# Patient Record
Sex: FEMALE | Race: WHITE | NOT HISPANIC OR LATINO | Employment: OTHER | URBAN - METROPOLITAN AREA
[De-identification: names, ages, dates, MRNs, and addresses within clinical notes are randomized per-mention and may not be internally consistent; named-entity substitution may affect disease eponyms.]

---

## 2023-02-28 LAB
ALANINE AMINOTRANSFERASE (SGPT) (U/L) IN SER/PLAS: 12 U/L (ref 7–45)
ALBUMIN (G/DL) IN SER/PLAS: 4.1 G/DL (ref 3.4–5)
ALKALINE PHOSPHATASE (U/L) IN SER/PLAS: 56 U/L (ref 33–136)
ANION GAP IN SER/PLAS: 10 MMOL/L (ref 10–20)
ASPARTATE AMINOTRANSFERASE (SGOT) (U/L) IN SER/PLAS: 17 U/L (ref 9–39)
BILIRUBIN TOTAL (MG/DL) IN SER/PLAS: 0.5 MG/DL (ref 0–1.2)
CALCIUM (MG/DL) IN SER/PLAS: 9.6 MG/DL (ref 8.6–10.6)
CARBON DIOXIDE, TOTAL (MMOL/L) IN SER/PLAS: 29 MMOL/L (ref 21–32)
CARCINOEMBRYONIC AG (NG/ML) IN SER/PLAS: 1.2 UG/L
CHLORIDE (MMOL/L) IN SER/PLAS: 105 MMOL/L (ref 98–107)
CREATININE (MG/DL) IN SER/PLAS: 0.83 MG/DL (ref 0.5–1.05)
GFR FEMALE: 69 ML/MIN/1.73M2
GLUCOSE (MG/DL) IN SER/PLAS: 163 MG/DL (ref 74–99)
POTASSIUM (MMOL/L) IN SER/PLAS: 4.1 MMOL/L (ref 3.5–5.3)
PROTEIN TOTAL: 6.1 G/DL (ref 6.4–8.2)
SODIUM (MMOL/L) IN SER/PLAS: 140 MMOL/L (ref 136–145)
UREA NITROGEN (MG/DL) IN SER/PLAS: 19 MG/DL (ref 6–23)

## 2023-03-20 DIAGNOSIS — Z13.5 DIABETIC RETINOPATHY SCREENING: Primary | ICD-10-CM

## 2023-03-20 DIAGNOSIS — E11.69 TYPE 2 DIABETES MELLITUS WITH OTHER SPECIFIED COMPLICATION, WITHOUT LONG-TERM CURRENT USE OF INSULIN (MULTI): ICD-10-CM

## 2023-03-20 RX ORDER — BLOOD-GLUCOSE METER
1 EACH MISCELLANEOUS 2 TIMES DAILY
COMMUNITY
Start: 2019-10-21 | End: 2023-03-20 | Stop reason: SDUPTHER

## 2023-03-20 RX ORDER — BLOOD-GLUCOSE METER
1 EACH MISCELLANEOUS 2 TIMES DAILY
Qty: 100 STRIP | Refills: 3 | Status: SHIPPED | OUTPATIENT
Start: 2023-03-20 | End: 2023-04-19

## 2023-04-02 PROCEDURE — G0179 MD RECERTIFICATION HHA PT: HCPCS | Performed by: INTERNAL MEDICINE

## 2023-04-08 PROBLEM — R59.0 LYMPHADENOPATHY, INGUINAL: Status: ACTIVE | Noted: 2023-04-08

## 2023-04-08 PROBLEM — R09.82 POSTNASAL DRIP: Status: ACTIVE | Noted: 2023-04-08

## 2023-04-08 PROBLEM — I10 PAROXYSMAL HYPERTENSION: Status: ACTIVE | Noted: 2023-04-08

## 2023-04-08 PROBLEM — E03.9 HYPOTHYROIDISM: Status: ACTIVE | Noted: 2023-04-08

## 2023-04-08 PROBLEM — N39.0 UTI (URINARY TRACT INFECTION): Status: ACTIVE | Noted: 2023-04-08

## 2023-04-08 PROBLEM — R05.9 COUGH: Status: ACTIVE | Noted: 2023-04-08

## 2023-04-08 PROBLEM — R14.2 BELCHING: Status: ACTIVE | Noted: 2023-04-08

## 2023-04-08 PROBLEM — R39.9 LOWER URINARY TRACT SYMPTOMS (LUTS): Status: ACTIVE | Noted: 2023-04-08

## 2023-04-08 PROBLEM — E66.9 CLASS 1 OBESITY WITH BODY MASS INDEX (BMI) OF 31.0 TO 31.9 IN ADULT: Status: ACTIVE | Noted: 2023-04-08

## 2023-04-08 PROBLEM — E11.9 DIABETES MELLITUS (MULTI): Status: ACTIVE | Noted: 2023-04-08

## 2023-04-08 PROBLEM — R63.4 ABNORMAL WEIGHT LOSS: Status: ACTIVE | Noted: 2023-04-08

## 2023-04-08 PROBLEM — R35.1 NOCTURIA: Status: ACTIVE | Noted: 2023-04-08

## 2023-04-08 PROBLEM — R10.2 FEMALE PELVIC PAIN: Status: ACTIVE | Noted: 2023-04-08

## 2023-04-08 PROBLEM — I48.91 ATRIAL FIBRILLATION (MULTI): Status: ACTIVE | Noted: 2023-04-08

## 2023-04-08 PROBLEM — R49.0 HOARSE: Status: ACTIVE | Noted: 2023-04-08

## 2023-04-08 PROBLEM — L85.3 DRY SKIN DERMATITIS: Status: ACTIVE | Noted: 2023-04-08

## 2023-04-08 PROBLEM — E53.8 VITAMIN B12 DEFICIENCY: Status: ACTIVE | Noted: 2023-04-08

## 2023-04-08 PROBLEM — C82.90 FOLLICULAR LYMPHOMA (MULTI): Status: ACTIVE | Noted: 2023-04-08

## 2023-04-08 PROBLEM — M79.606 LEG PAIN: Status: ACTIVE | Noted: 2023-04-08

## 2023-04-08 PROBLEM — R60.0 EDEMA OF BOTH LEGS: Status: ACTIVE | Noted: 2023-04-08

## 2023-04-08 PROBLEM — M25.50 ARTHRALGIA: Status: ACTIVE | Noted: 2023-04-08

## 2023-04-08 PROBLEM — E66.811 CLASS 1 OBESITY WITH BODY MASS INDEX (BMI) OF 31.0 TO 31.9 IN ADULT: Status: ACTIVE | Noted: 2023-04-08

## 2023-04-08 PROBLEM — E55.9 VITAMIN D DEFICIENCY: Status: ACTIVE | Noted: 2023-04-08

## 2023-04-08 PROBLEM — R39.9 URINARY SYMPTOM OR SIGN: Status: ACTIVE | Noted: 2023-04-08

## 2023-04-08 PROBLEM — K58.9 IRRITABLE BOWEL SYNDROME: Status: ACTIVE | Noted: 2023-04-08

## 2023-04-08 PROBLEM — D64.9 ANEMIA: Status: ACTIVE | Noted: 2023-04-08

## 2023-04-08 PROBLEM — N28.89 PELVICALIECTASIS: Status: ACTIVE | Noted: 2023-04-08

## 2023-04-08 PROBLEM — E78.2 HYPERLIPIDEMIA, MIXED: Status: ACTIVE | Noted: 2023-04-08

## 2023-04-08 PROBLEM — M25.512 LEFT SHOULDER PAIN: Status: ACTIVE | Noted: 2023-04-08

## 2023-04-08 PROBLEM — I48.0 PAROXYSMAL ATRIAL FIBRILLATION (MULTI): Status: ACTIVE | Noted: 2023-04-08

## 2023-04-08 PROBLEM — K90.0 NONTROPICAL SPRUE (HHS-HCC): Status: ACTIVE | Noted: 2023-04-08

## 2023-04-08 PROBLEM — K21.9 GASTROESOPHAGEAL REFLUX DISEASE: Status: ACTIVE | Noted: 2023-04-08

## 2023-04-08 PROBLEM — K29.70 GASTRITIS: Status: ACTIVE | Noted: 2023-04-08

## 2023-04-08 PROBLEM — K29.70 ERYTHEMATOUS GASTROPATHY: Status: ACTIVE | Noted: 2023-04-08

## 2023-04-08 PROBLEM — N95.2 ATROPHIC VAGINITIS: Status: ACTIVE | Noted: 2023-04-08

## 2023-04-08 PROBLEM — R11.0 DAILY NAUSEA: Status: ACTIVE | Noted: 2023-04-08

## 2023-04-08 PROBLEM — R10.9 ABDOMINAL PAIN: Status: ACTIVE | Noted: 2023-04-08

## 2023-04-08 PROBLEM — R14.0 BLOATING: Status: ACTIVE | Noted: 2023-04-08

## 2023-04-08 PROBLEM — N39.41 URGE INCONTINENCE: Status: ACTIVE | Noted: 2023-04-08

## 2023-04-08 PROBLEM — L30.9 DERMATITIS: Status: ACTIVE | Noted: 2023-04-08

## 2023-04-08 PROBLEM — K59.00 CONSTIPATION: Status: ACTIVE | Noted: 2023-04-08

## 2023-04-08 PROBLEM — G45.9 TIA (TRANSIENT ISCHEMIC ATTACK): Status: ACTIVE | Noted: 2023-04-08

## 2023-04-08 PROBLEM — R39.15 URINARY URGENCY: Status: ACTIVE | Noted: 2023-04-08

## 2023-04-08 PROBLEM — M25.552 HIP PAIN, LEFT: Status: ACTIVE | Noted: 2023-04-08

## 2023-04-08 PROBLEM — J45.909 ASTHMA (HHS-HCC): Status: ACTIVE | Noted: 2023-04-08

## 2023-04-08 PROBLEM — R59.0 LYMPHADENOPATHY, CERVICAL: Status: ACTIVE | Noted: 2023-04-08

## 2023-04-08 PROBLEM — J39.2 NASOPHARYNGEAL MASS: Status: ACTIVE | Noted: 2023-04-08

## 2023-04-08 PROBLEM — N81.89 PELVIC FLOOR WEAKNESS: Status: ACTIVE | Noted: 2023-04-08

## 2023-04-08 PROBLEM — R91.1 PULMONARY NODULE: Status: ACTIVE | Noted: 2023-04-08

## 2023-04-08 PROBLEM — R00.2 PALPITATIONS: Status: ACTIVE | Noted: 2023-04-08

## 2023-04-08 PROBLEM — M85.80 OSTEOPENIA: Status: ACTIVE | Noted: 2023-04-08

## 2023-04-08 PROBLEM — U07.1 DISEASE DUE TO SEVERE ACUTE RESPIRATORY SYNDROME CORONAVIRUS 2 (SARS-COV-2): Status: ACTIVE | Noted: 2023-04-08

## 2023-04-08 PROBLEM — F41.8 DEPRESSION WITH ANXIETY: Status: ACTIVE | Noted: 2023-04-08

## 2023-04-08 PROBLEM — K44.9 HIATAL HERNIA: Status: ACTIVE | Noted: 2023-04-08

## 2023-04-08 PROBLEM — M54.9 NOTALGIA: Status: ACTIVE | Noted: 2023-04-08

## 2023-04-08 PROBLEM — R19.4 CHANGE IN BOWEL HABITS: Status: ACTIVE | Noted: 2023-04-08

## 2023-04-08 PROBLEM — N32.81 OVERACTIVE BLADDER: Status: ACTIVE | Noted: 2023-04-08

## 2023-04-08 PROBLEM — E04.2 NONTOXIC MULTINODULAR GOITER: Status: ACTIVE | Noted: 2023-04-08

## 2023-04-08 PROBLEM — R73.03 PREDIABETES: Status: ACTIVE | Noted: 2023-04-08

## 2023-04-08 PROBLEM — I86.8 VARICOSE VEINS OF OTHER SPECIFIED SITES: Status: ACTIVE | Noted: 2023-04-08

## 2023-04-08 PROBLEM — R79.9 ABNORMAL BLOOD CHEMISTRY: Status: ACTIVE | Noted: 2023-04-08

## 2023-04-08 PROBLEM — I25.10 ARTERIOSCLEROSIS OF CORONARY ARTERY: Status: ACTIVE | Noted: 2023-04-08

## 2023-04-08 PROBLEM — M25.562 LEFT KNEE PAIN: Status: ACTIVE | Noted: 2023-04-08

## 2023-04-08 PROBLEM — M54.50 LOW BACK PAIN: Status: ACTIVE | Noted: 2023-04-08

## 2023-04-08 PROBLEM — Z20.822 EXPOSURE TO COVID-19 VIRUS: Status: ACTIVE | Noted: 2023-04-08

## 2023-04-08 RX ORDER — FOLIC ACID 1 MG/1
1 TABLET ORAL DAILY
COMMUNITY
End: 2023-04-17 | Stop reason: ALTCHOICE

## 2023-04-08 RX ORDER — AMLODIPINE BESYLATE 2.5 MG/1
1 TABLET ORAL DAILY
COMMUNITY
Start: 2019-05-01 | End: 2023-06-30

## 2023-04-08 RX ORDER — FLECAINIDE ACETATE 100 MG/1
1 TABLET ORAL EVERY 12 HOURS
COMMUNITY
Start: 2016-07-19 | End: 2023-07-28

## 2023-04-08 RX ORDER — CHOLECALCIFEROL (VITAMIN D3) 50 MCG
1 TABLET ORAL DAILY
COMMUNITY
Start: 2017-08-02 | End: 2023-05-26

## 2023-04-08 RX ORDER — DOCUSATE SODIUM 100 MG/1
1 CAPSULE, LIQUID FILLED ORAL 2 TIMES DAILY
COMMUNITY
Start: 2022-06-22

## 2023-04-08 RX ORDER — LANSOPRAZOLE 30 MG/1
1 TABLET, ORALLY DISINTEGRATING, DELAYED RELEASE ORAL DAILY
COMMUNITY
Start: 2020-04-23 | End: 2024-02-20 | Stop reason: ALTCHOICE

## 2023-04-08 RX ORDER — PANCRELIPASE 24000; 76000; 120000 [USP'U]/1; [USP'U]/1; [USP'U]/1
3 CAPSULE, DELAYED RELEASE PELLETS ORAL DAILY
COMMUNITY
Start: 2015-11-03 | End: 2023-06-30

## 2023-04-08 RX ORDER — DICYCLOMINE HYDROCHLORIDE 10 MG/1
2 CAPSULE ORAL DAILY
COMMUNITY
End: 2023-04-17 | Stop reason: SDUPTHER

## 2023-04-08 RX ORDER — ALBUTEROL SULFATE 90 UG/1
2 AEROSOL, METERED RESPIRATORY (INHALATION) EVERY 6 HOURS PRN
COMMUNITY
Start: 2012-08-09

## 2023-04-08 RX ORDER — LEVOTHYROXINE SODIUM 88 UG/1
1 TABLET ORAL DAILY
COMMUNITY
Start: 2013-04-17 | End: 2023-04-25

## 2023-04-08 RX ORDER — ATORVASTATIN CALCIUM 20 MG/1
1 TABLET, FILM COATED ORAL DAILY
COMMUNITY
Start: 2015-10-07 | End: 2023-06-30

## 2023-04-08 RX ORDER — BIOTIN 1 MG
TABLET ORAL
COMMUNITY
Start: 2018-09-21 | End: 2024-03-25

## 2023-04-08 RX ORDER — ESCITALOPRAM OXALATE 5 MG/1
1 TABLET ORAL DAILY
COMMUNITY
Start: 2015-10-07

## 2023-04-17 ENCOUNTER — OFFICE VISIT (OUTPATIENT)
Dept: PRIMARY CARE | Facility: CLINIC | Age: 88
End: 2023-04-17
Payer: MEDICARE

## 2023-04-17 VITALS
TEMPERATURE: 97 F | DIASTOLIC BLOOD PRESSURE: 76 MMHG | HEART RATE: 60 BPM | BODY MASS INDEX: 28.05 KG/M2 | HEIGHT: 57 IN | RESPIRATION RATE: 16 BRPM | WEIGHT: 130 LBS | SYSTOLIC BLOOD PRESSURE: 126 MMHG

## 2023-04-17 DIAGNOSIS — R05.9 COUGH, UNSPECIFIED TYPE: ICD-10-CM

## 2023-04-17 DIAGNOSIS — E53.8 FOLATE DEFICIENCY: ICD-10-CM

## 2023-04-17 DIAGNOSIS — E11.9 TYPE 2 DIABETES MELLITUS WITHOUT COMPLICATION, WITHOUT LONG-TERM CURRENT USE OF INSULIN (MULTI): ICD-10-CM

## 2023-04-17 DIAGNOSIS — E55.9 VITAMIN D DEFICIENCY: ICD-10-CM

## 2023-04-17 DIAGNOSIS — D50.9 IRON DEFICIENCY ANEMIA, UNSPECIFIED IRON DEFICIENCY ANEMIA TYPE: ICD-10-CM

## 2023-04-17 DIAGNOSIS — R10.84 GENERALIZED ABDOMINAL PAIN: Primary | ICD-10-CM

## 2023-04-17 DIAGNOSIS — E03.9 HYPOTHYROIDISM, UNSPECIFIED TYPE: ICD-10-CM

## 2023-04-17 DIAGNOSIS — E78.2 HYPERLIPIDEMIA, MIXED: ICD-10-CM

## 2023-04-17 DIAGNOSIS — E53.8 VITAMIN B12 DEFICIENCY: ICD-10-CM

## 2023-04-17 LAB
CREAT UR STRIP-MCNC: 200 MG/DL
MICROALBUMIN UR TEST STR-MCNC: 10 MG/L
PROT/CREAT UR: <30 UG/MG CREAT

## 2023-04-17 PROCEDURE — 3078F DIAST BP <80 MM HG: CPT | Performed by: INTERNAL MEDICINE

## 2023-04-17 PROCEDURE — 1036F TOBACCO NON-USER: CPT | Performed by: INTERNAL MEDICINE

## 2023-04-17 PROCEDURE — 1159F MED LIST DOCD IN RCRD: CPT | Performed by: INTERNAL MEDICINE

## 2023-04-17 PROCEDURE — 82570 ASSAY OF URINE CREATININE: CPT | Performed by: INTERNAL MEDICINE

## 2023-04-17 PROCEDURE — 83036 HEMOGLOBIN GLYCOSYLATED A1C: CPT | Performed by: INTERNAL MEDICINE

## 2023-04-17 PROCEDURE — 82306 VITAMIN D 25 HYDROXY: CPT | Performed by: INTERNAL MEDICINE

## 2023-04-17 PROCEDURE — 84443 ASSAY THYROID STIM HORMONE: CPT | Performed by: INTERNAL MEDICINE

## 2023-04-17 PROCEDURE — 1160F RVW MEDS BY RX/DR IN RCRD: CPT | Performed by: INTERNAL MEDICINE

## 2023-04-17 PROCEDURE — 99213 OFFICE O/P EST LOW 20 MIN: CPT | Performed by: INTERNAL MEDICINE

## 2023-04-17 PROCEDURE — 3074F SYST BP LT 130 MM HG: CPT | Performed by: INTERNAL MEDICINE

## 2023-04-17 PROCEDURE — 80053 COMPREHEN METABOLIC PANEL: CPT | Performed by: INTERNAL MEDICINE

## 2023-04-17 PROCEDURE — 80061 LIPID PANEL: CPT | Performed by: INTERNAL MEDICINE

## 2023-04-17 PROCEDURE — 82746 ASSAY OF FOLIC ACID SERUM: CPT | Performed by: INTERNAL MEDICINE

## 2023-04-17 PROCEDURE — 36415 COLL VENOUS BLD VENIPUNCTURE: CPT | Performed by: INTERNAL MEDICINE

## 2023-04-17 PROCEDURE — 85025 COMPLETE CBC W/AUTO DIFF WBC: CPT | Performed by: INTERNAL MEDICINE

## 2023-04-17 PROCEDURE — 82043 UR ALBUMIN QUANTITATIVE: CPT | Performed by: INTERNAL MEDICINE

## 2023-04-17 PROCEDURE — 82607 VITAMIN B-12: CPT | Performed by: INTERNAL MEDICINE

## 2023-04-17 RX ORDER — DICYCLOMINE HYDROCHLORIDE 10 MG/1
20 CAPSULE ORAL DAILY
Qty: 60 CAPSULE | Refills: 2 | Status: SHIPPED | OUTPATIENT
Start: 2023-04-17 | End: 2023-04-19 | Stop reason: SDUPTHER

## 2023-04-17 RX ORDER — AZITHROMYCIN 250 MG/1
TABLET, FILM COATED ORAL
Qty: 6 TABLET | Refills: 0 | Status: SHIPPED | OUTPATIENT
Start: 2023-04-17 | End: 2023-04-22

## 2023-04-17 ASSESSMENT — PATIENT HEALTH QUESTIONNAIRE - PHQ9
3. TROUBLE FALLING OR STAYING ASLEEP OR SLEEPING TOO MUCH: NEARLY EVERY DAY
8. MOVING OR SPEAKING SO SLOWLY THAT OTHER PEOPLE COULD HAVE NOTICED. OR THE OPPOSITE, BEING SO FIGETY OR RESTLESS THAT YOU HAVE BEEN MOVING AROUND A LOT MORE THAN USUAL: NOT AT ALL
5. POOR APPETITE OR OVEREATING: NEARLY EVERY DAY
SUM OF ALL RESPONSES TO PHQ QUESTIONS 1-9: 16
SUM OF ALL RESPONSES TO PHQ9 QUESTIONS 1 AND 2: 6
2. FEELING DOWN, DEPRESSED OR HOPELESS: NEARLY EVERY DAY
7. TROUBLE CONCENTRATING ON THINGS, SUCH AS READING THE NEWSPAPER OR WATCHING TELEVISION: SEVERAL DAYS
4. FEELING TIRED OR HAVING LITTLE ENERGY: NEARLY EVERY DAY
10. IF YOU CHECKED OFF ANY PROBLEMS, HOW DIFFICULT HAVE THESE PROBLEMS MADE IT FOR YOU TO DO YOUR WORK, TAKE CARE OF THINGS AT HOME, OR GET ALONG WITH OTHER PEOPLE: VERY DIFFICULT
9. THOUGHTS THAT YOU WOULD BE BETTER OFF DEAD, OR OF HURTING YOURSELF: NOT AT ALL
1. LITTLE INTEREST OR PLEASURE IN DOING THINGS: NEARLY EVERY DAY
6. FEELING BAD ABOUT YOURSELF - OR THAT YOU ARE A FAILURE OR HAVE LET YOURSELF OR YOUR FAMILY DOWN: NOT AT ALL

## 2023-04-17 ASSESSMENT — ENCOUNTER SYMPTOMS
COUGH: 1
LIGHT-HEADEDNESS: 1
SLEEP DISTURBANCE: 1
COLOR CHANGE: 0
LOSS OF SENSATION IN FEET: 0
OCCASIONAL FEELINGS OF UNSTEADINESS: 1
BACK PAIN: 1
HEADACHES: 1
APPETITE CHANGE: 0
DEPRESSION: 1
ACTIVITY CHANGE: 0
SHORTNESS OF BREATH: 1
FREQUENCY: 1
ARTHRALGIAS: 1
FATIGUE: 1

## 2023-04-17 ASSESSMENT — COLUMBIA-SUICIDE SEVERITY RATING SCALE - C-SSRS
1. IN THE PAST MONTH, HAVE YOU WISHED YOU WERE DEAD OR WISHED YOU COULD GO TO SLEEP AND NOT WAKE UP?: NO
2. HAVE YOU ACTUALLY HAD ANY THOUGHTS OF KILLING YOURSELF?: NO
6. HAVE YOU EVER DONE ANYTHING, STARTED TO DO ANYTHING, OR PREPARED TO DO ANYTHING TO END YOUR LIFE?: NO

## 2023-04-17 ASSESSMENT — PAIN SCALES - GENERAL: PAINLEVEL: 0-NO PAIN

## 2023-04-17 NOTE — PROGRESS NOTES
Subjective    Sylvia Martinez is a 87 y.o. female who presents for cough and some SOB.  Has abdominal discomfort  Weak at time.  Complaining of cough.    HPI      This is an 87 years old Yemeni speaking lady with medical history significant for abdominal pain, gastroesophageal reflux disease, chronic bloating, discomfort, constipation, history of fundoplasty, varicosities, severe low back pain, lumbosacral stenosis, arthralgia, hypothyroidism, diabetes, hyper lipidemia, vitamin D. deficiency, depression, anxiety, headaches.,s/p EGD, colonoscopy 8/13/2018 with small cecal polyp, diverticulosis, gastropathy, s/p Hospitalization 5/14/2021 for arrhythmia, s/p admission to [place] 4/7/2022 to 4/8/2022 with atrial fibrillation, s/p L cataract surgery 6/7/2022, COVID -19 8/26/2022  Patient is presented for  evaluation and treatment of the above complaints.  Has been having cough, mostly, after COVID illness.  Weak at time.  Has abdominal discomfort.  Stated, that has been taking medications as directed.  No issues.  Self stopped taking flecainide.  No evidence of atrial fibrillation.  Denies to have any chest pain, shortness of breath, no fever or chills.  Has no headaches.    Followed by Dr. Galo.     Stated, that followed by the urology.  Blood glucose is stable, controlled by diet.  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  Advised to follow-up in 3 months.  Has lymphadenopathy, stable.   Blood pressure is stable, better controlled.  Has occasional constipations.  Weak at the time.    Review of Systems   Constitutional:  Positive for fatigue. Negative for activity change and appetite change.   Respiratory:  Positive for cough and shortness of breath.    Cardiovascular:  Positive for leg swelling. Negative for chest pain.   Genitourinary:  Positive for frequency.   Musculoskeletal:  Positive for arthralgias and back pain.   Skin:  Negative for color change.   Neurological:  Positive for  light-headedness and headaches.   Psychiatric/Behavioral:  Positive for sleep disturbance.        Objective        Vitals:    04/17/23 0914   BP: 126/76   Pulse: 60   Resp: 16   Temp: 36.1 °C (97 °F)        Physical Exam  Constitutional:       Appearance: Normal appearance.   HENT:      Head: Normocephalic and atraumatic.      Nose: Nose normal.   Cardiovascular:      Rate and Rhythm: Normal rate and regular rhythm.      Pulses: Normal pulses.      Heart sounds: Normal heart sounds.   Pulmonary:      Effort: Pulmonary effort is normal.      Breath sounds: Normal breath sounds.   Abdominal:      Palpations: Abdomen is soft.   Musculoskeletal:         General: Normal range of motion.   Skin:     General: Skin is warm.   Neurological:      General: No focal deficit present.      Mental Status: She is alert and oriented to person, place, and time.   Psychiatric:         Mood and Affect: Mood normal.         Behavior: Behavior normal.         Thought Content: Thought content normal.         Judgment: Judgment normal.       Diagnoses and all orders for this visit:  Generalized abdominal pain (Primary)  -     dicyclomine (Bentyl) 10 mg capsule; Take 2 capsules (20 mg) by mouth once daily.  Vitamin D deficiency  -     Vitamin D, Total  Iron deficiency anemia, unspecified iron deficiency anemia type  -     CBC  Hyperlipidemia, mixed  -     Comprehensive Metabolic Panel  -     Lipid Panel  Type 2 diabetes mellitus without complication, without long-term current use of insulin (CMS/[facility])  -     Hemoglobin A1C  Hypothyroidism, unspecified type  -     Thyroid Stimulating Hormone  Folate deficiency  -     Folate  Vitamin B12 deficiency  -     Vitamin B12     - S/p Right eye surgery cataract 1/17/2023.   Good results.    - Atrial fibrillation episodes.  Palpitations.  Followed by Dr Galo. EP cardiology.    - Hypertension.  Well-controlled.  Current therapy.  Taking Amlodipine 2.5 mg daily.  Exercise, avoid salt.     - H of   COVID -19 illness 8/26/2022.  Has still cough, some shortness of breath.  Extremely weak.   Could not tolerate medications.  Chest X ray.    -Nasopharyngeal lesion/ Neck mass, Mostly, due to Follicular Lymphoma.  S/p Bx by Dr Booker 7/22/2019.  Seen by Dr Trivedi, advised to continue close monitoring.  Returning for follow up with Dr Meyer, Dr Booker.    - Pulmonary nodule.  Letter came from the cardiology Dr. Cervantes regarding pulmonary nodule, noticed on Lexiscan stress test.  CT scan of the chest will be done.    -Overactive Bladder.  Stable.  Follow up with urology.    -Bacterial overgrowth syndrome.  Abdominal discomfort.  Bloating.   IBS.  Continue to use  Dicyclomine.  Strict diet.  Follow-up with GI.    -Diabetes type II.  Accu-Cheks, take medications as directed.  Diet, exercise.  Off medications.     - Anxiety, depression.  Follow-up with psychiatry.  Taking escitalopram.    - Asthma.  COPD.  Pro-air as directed.  Use Trelegy, samples are given.    - Health maintenance.  Medicare Wellness Annual Exam is  up to date.  Vaccinations.  Colonoscopy screening is not indicated.  Declined to see GYN.    - Overweight with BMI 28.13  Diet, exercise.  Keep ideal BMI is less than 25.        Brigida Valdez MD

## 2023-06-30 ENCOUNTER — OFFICE VISIT (OUTPATIENT)
Dept: PRIMARY CARE | Facility: CLINIC | Age: 88
End: 2023-06-30
Payer: MEDICARE

## 2023-06-30 VITALS
RESPIRATION RATE: 16 BRPM | BODY MASS INDEX: 28.48 KG/M2 | HEIGHT: 57 IN | TEMPERATURE: 97.5 F | WEIGHT: 132 LBS | SYSTOLIC BLOOD PRESSURE: 122 MMHG | DIASTOLIC BLOOD PRESSURE: 68 MMHG | HEART RATE: 62 BPM

## 2023-06-30 DIAGNOSIS — J45.909 ASTHMA, UNSPECIFIED ASTHMA SEVERITY, UNSPECIFIED WHETHER COMPLICATED, UNSPECIFIED WHETHER PERSISTENT (HHS-HCC): ICD-10-CM

## 2023-06-30 DIAGNOSIS — Z78.0 ASYMPTOMATIC MENOPAUSAL STATE: Primary | ICD-10-CM

## 2023-06-30 DIAGNOSIS — R10.9 ABDOMINAL PAIN, UNSPECIFIED ABDOMINAL LOCATION: ICD-10-CM

## 2023-06-30 DIAGNOSIS — E78.2 HYPERLIPIDEMIA, MIXED: ICD-10-CM

## 2023-06-30 DIAGNOSIS — E11.69 TYPE 2 DIABETES MELLITUS WITH OTHER SPECIFIED COMPLICATION, WITHOUT LONG-TERM CURRENT USE OF INSULIN (MULTI): ICD-10-CM

## 2023-06-30 DIAGNOSIS — D50.9 IRON DEFICIENCY ANEMIA, UNSPECIFIED IRON DEFICIENCY ANEMIA TYPE: ICD-10-CM

## 2023-06-30 LAB
CREAT UR STRIP-MCNC: 10 MG/DL
MICROALBUMIN UR TEST STR-MCNC: 10 MG/L
PROT/CREAT UR: <30 UG/MG CREAT

## 2023-06-30 PROCEDURE — 82570 ASSAY OF URINE CREATININE: CPT | Performed by: INTERNAL MEDICINE

## 2023-06-30 PROCEDURE — 99213 OFFICE O/P EST LOW 20 MIN: CPT | Performed by: INTERNAL MEDICINE

## 2023-06-30 PROCEDURE — 1160F RVW MEDS BY RX/DR IN RCRD: CPT | Performed by: INTERNAL MEDICINE

## 2023-06-30 PROCEDURE — 3078F DIAST BP <80 MM HG: CPT | Performed by: INTERNAL MEDICINE

## 2023-06-30 PROCEDURE — 1036F TOBACCO NON-USER: CPT | Performed by: INTERNAL MEDICINE

## 2023-06-30 PROCEDURE — 83036 HEMOGLOBIN GLYCOSYLATED A1C: CPT | Performed by: INTERNAL MEDICINE

## 2023-06-30 PROCEDURE — 1159F MED LIST DOCD IN RCRD: CPT | Performed by: INTERNAL MEDICINE

## 2023-06-30 PROCEDURE — 80053 COMPREHEN METABOLIC PANEL: CPT | Performed by: INTERNAL MEDICINE

## 2023-06-30 PROCEDURE — 82043 UR ALBUMIN QUANTITATIVE: CPT | Performed by: INTERNAL MEDICINE

## 2023-06-30 PROCEDURE — 3074F SYST BP LT 130 MM HG: CPT | Performed by: INTERNAL MEDICINE

## 2023-06-30 RX ORDER — BUDESONIDE AND FORMOTEROL FUMARATE DIHYDRATE 80; 4.5 UG/1; UG/1
2 AEROSOL RESPIRATORY (INHALATION)
Qty: 16 G | Refills: 2 | Status: SHIPPED | OUTPATIENT
Start: 2023-06-30 | End: 2023-09-22

## 2023-06-30 RX ORDER — PANCRELIPASE LIPASE, PANCRELIPASE AMYLASE, AND PANCRELIPASE PROTEASE 21000; 83900; 54700 [USP'U]/1; [USP'U]/1; [USP'U]/1
1 CAPSULE, DELAYED RELEASE ORAL
Qty: 90 CAPSULE | Refills: 3 | Status: SHIPPED | OUTPATIENT
Start: 2023-06-30 | End: 2023-11-28 | Stop reason: ALTCHOICE

## 2023-06-30 RX ORDER — PANCRELIPASE LIPASE, PANCRELIPASE AMYLASE, AND PANCRELIPASE PROTEASE 21000; 83900; 54700 [USP'U]/1; [USP'U]/1; [USP'U]/1
1 CAPSULE, DELAYED RELEASE ORAL
Qty: 90 CAPSULE | Refills: 3 | Status: SHIPPED | OUTPATIENT
Start: 2023-06-30 | End: 2023-06-30 | Stop reason: SDUPTHER

## 2023-06-30 ASSESSMENT — ENCOUNTER SYMPTOMS
BACK PAIN: 1
CONSTIPATION: 1
UNEXPECTED WEIGHT CHANGE: 0
SLEEP DISTURBANCE: 1
ABDOMINAL DISTENTION: 1
ACTIVITY CHANGE: 1
APPETITE CHANGE: 1
ARTHRALGIAS: 1

## 2023-06-30 NOTE — PROGRESS NOTES
Subjective    Sylvia Martinez is a 87 y.o. female who presents for follow up.  Has insomnia.  Having abdominal pain, bloating, discomfort.    HPI  This is an 87 years old Brazilian speaking lady with medical history significant for abdominal pain, gastroesophageal reflux disease, chronic bloating, discomfort, constipation, history of fundoplasty, varicosities, severe low back pain, lumbosacral stenosis, arthralgia, hypothyroidism, diabetes, hyper lipidemia, vitamin D. deficiency, depression, anxiety, headaches.,s/p EGD, colonoscopy 8/13/2018 with small cecal polyp, diverticulosis, gastropathy, s/p Hospitalization 5/14/2021 for arrhythmia, s/p admission to [place] 4/7/2022 to 4/8/2022 with atrial fibrillation, s/p L cataract surgery 6/7/2022, COVID -19 8/26/2022  Patient is presented for  evaluation and treatment of the above complaints.     Has abdominal discomfort, bloating, interested to try Pancreaze.  Stated, that has been taking medications as directed.  No issues.  Self stopped taking flecainide.  No evidence of atrial fibrillation.  Denies to have any chest pain, shortness of breath, no fever or chills.  Has no headaches.     Followed by Dr. Galo.     Stated, that followed by the urology.  Blood glucose is stable, controlled by diet.  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  Advised to follow-up in 3 months.  Has lymphadenopathy, stable.   Blood pressure is stable, better controlled.  Has occasional constipations.  Weak at the time.    Review of Systems   Constitutional:  Positive for activity change and appetite change. Negative for unexpected weight change.   Gastrointestinal:  Positive for abdominal distention and constipation.   Musculoskeletal:  Positive for arthralgias and back pain.   Psychiatric/Behavioral:  Positive for sleep disturbance.        Objective        Vitals:    06/30/23 1006   Temp: 36.4 °C (97.5 °F)        Physical Exam  Constitutional:       Appearance: Normal  appearance.   HENT:      Head: Normocephalic.      Nose: Nose normal.   Eyes:      Pupils: Pupils are equal, round, and reactive to light.   Cardiovascular:      Rate and Rhythm: Normal rate.   Pulmonary:      Breath sounds: Normal breath sounds.   Abdominal:      Palpations: Abdomen is soft.   Musculoskeletal:         General: Normal range of motion.      Cervical back: Normal range of motion.   Skin:     General: Skin is warm.   Neurological:      General: No focal deficit present.      Mental Status: She is alert.       Diabetic foot exam good pulses, intact skin.      Diagnoses and all orders for this visit:  Asymptomatic menopausal state (Primary)  -     XR DEXA bone density; Future  Iron deficiency anemia, unspecified iron deficiency anemia type  -     CBC  Hyperlipidemia, mixed  -     Comprehensive Metabolic Panel  Abdominal pain, unspecified abdominal location  -     Discontinue: lipase-protease-amylase (Pancreaze) 21,000-54,700- 83,900 unit capsule; Take 1 capsule by mouth 3 times a day with meals.  -     lipase-protease-amylase (Pancreaze) 21,000-54,700- 83,900 unit capsule; Take 1 capsule by mouth 3 times a day with meals.  Asthma, unspecified asthma severity, unspecified whether complicated, unspecified whether persistent  -     budesonide-formoteroL (Symbicort) 80-4.5 mcg/actuation inhaler; Inhale 2 puffs 2 times a day. Rinse mouth with water after use to reduce aftertaste and incidence of candidiasis. Do not swallow.     -  Atrial fibrillation episodes.  Palpitations.  Followed by Dr Galo. EP cardiology.  Patient is self changed Flecainide administration.  Taking daily.     - Hypertension.  Well-controlled.  Current therapy.  Taking Amlodipine 2.5 mg daily.  Exercise, avoid salt.      S/p Right eye surgery cataract 1/17/2023.   Good results.    - H of  COVID -19 illness 8/26/2022.  Has still cough, some shortness of breath.  Extremely weak.   Could not tolerate medications.  Chest X ray.      -Nasopharyngeal lesion/ Neck mass, Mostly, due to Follicular Lymphoma.  S/p Bx by Dr Booker 7/22/2019.  Seen by Dr Trivedi, advised to continue close monitoring.  Returning for follow up with Dr Meyer, Dr Booker.     - Pulmonary nodule.  Letter came from the cardiology Dr. Cervantes regarding pulmonary nodule, noticed on Lexiscan stress test.  CT scan of the chest will be done.     -Overactive Bladder.  Stable.  Follow up with urology.     -Bacterial overgrowth syndrome.  Abdominal discomfort.  Bloating.   IBS.  Continue to use  Dicyclomine.  Strict diet.  Follow-up with GI.     -Diabetes type II.  Accu-Cheks, take medications as directed.  Diet, exercise.  Off medications.     - Anxiety, depression.  Follow-up with psychiatry.  Taking escitalopram.     - Asthma.  COPD.  Pro-air as directed.  Use Trelegy, samples are given.     - Health maintenance.  Medicare Wellness Annual Exam is  up to date.  Vaccinations.  Colonoscopy screening is not indicated.  Declined to see GYN.    Hearing loss.  Has bilateral hearing aids.    - Overweight with BMI 29.07  Diet, exercise.  Keep ideal BMI is less than 25.    Blood work results d/w   .  Hemoglobin 11.5, hematocrit 36.4.  BUN 24, glucose 125.  Rest of tests is pending.  Current therapy for now.  Follow up in 1 month.     Brigida Valdez MD

## 2023-07-31 PROCEDURE — G0179 MD RECERTIFICATION HHA PT: HCPCS | Performed by: INTERNAL MEDICINE

## 2023-09-05 DIAGNOSIS — I48.0 PAROXYSMAL ATRIAL FIBRILLATION (MULTI): ICD-10-CM

## 2023-09-20 DIAGNOSIS — E78.2 HYPERLIPIDEMIA, MIXED: ICD-10-CM

## 2023-09-20 DIAGNOSIS — I48.0 PAROXYSMAL ATRIAL FIBRILLATION (MULTI): ICD-10-CM

## 2023-09-20 DIAGNOSIS — I10 ESSENTIAL (PRIMARY) HYPERTENSION: ICD-10-CM

## 2023-09-20 RX ORDER — FLECAINIDE ACETATE 100 MG/1
100 TABLET ORAL EVERY 12 HOURS
Qty: 60 TABLET | Refills: 0 | Status: SHIPPED | OUTPATIENT
Start: 2023-09-20 | End: 2023-10-17

## 2023-09-20 RX ORDER — ATORVASTATIN CALCIUM 20 MG/1
20 TABLET, FILM COATED ORAL DAILY
Qty: 30 TABLET | Refills: 0 | Status: SHIPPED | OUTPATIENT
Start: 2023-09-20 | End: 2023-10-18

## 2023-09-20 RX ORDER — AMLODIPINE BESYLATE 2.5 MG/1
2.5 TABLET ORAL DAILY
Qty: 30 TABLET | Refills: 0 | Status: SHIPPED | OUTPATIENT
Start: 2023-09-20 | End: 2023-10-18

## 2023-09-22 DIAGNOSIS — J45.909 ASTHMA, UNSPECIFIED ASTHMA SEVERITY, UNSPECIFIED WHETHER COMPLICATED, UNSPECIFIED WHETHER PERSISTENT (HHS-HCC): ICD-10-CM

## 2023-09-22 RX ORDER — BUDESONIDE AND FORMOTEROL FUMARATE DIHYDRATE 80; 4.5 UG/1; UG/1
2 AEROSOL RESPIRATORY (INHALATION) 2 TIMES DAILY
Qty: 10.2 G | Refills: 0 | Status: SHIPPED | OUTPATIENT
Start: 2023-09-22 | End: 2023-09-27

## 2023-09-25 DIAGNOSIS — E55.9 VITAMIN D DEFICIENCY: ICD-10-CM

## 2023-09-25 RX ORDER — ACETAMINOPHEN 500 MG
50 TABLET ORAL DAILY
Qty: 30 CAPSULE | Refills: 0 | Status: SHIPPED | OUTPATIENT
Start: 2023-09-25 | End: 2024-03-19

## 2023-09-28 NOTE — PROGRESS NOTES
Subjective    Sylvia Martinez is a 87 y.o. female who presents for.  Complaining of palpitations.  Very weak.  Has insomnia.  Patient is here for blood work.    HPI  This is an 87 years old Turkmen speaking lady with medical history significant for abdominal pain, gastroesophageal reflux disease, chronic bloating, discomfort, constipation, history of fundoplasty, varicosities, severe low back pain, lumbosacral stenosis, arthralgia, hypothyroidism, diabetes, hyper lipidemia, vitamin D. deficiency, depression, anxiety, headaches.,s/p EGD, colonoscopy 8/13/2018 with small cecal polyp, diverticulosis, gastropathy, s/p Hospitalization 5/14/2021 for arrhythmia, s/p admission to [place] 4/7/2022 to 4/8/2022 with atrial fibrillation, s/p L cataract surgery 6/7/2022, COVID -19 8/26/2022  Patient is presented for  evaluation and treatment of the above complaints.   Patient was seen by Dr Meyer, due to Bx notes.  Has had PET scan done.     Stated, that has been taking medications as directed.  No issues.    Denies to have any chest pain, shortness of breath, no fever or chills.  Has no headaches.     Followed by Dr. Galo.     Stated, that followed by the urology.  Blood glucose is stable, controlled by diet.  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  Advised to follow-up in 3 months.  Has lymphadenopathy, stable.   Blood pressure is stable, better controlled.  Has occasional constipations.  Weak at the time.     Review of Systems   Constitutional:  Positive for activity change and appetite change.   Cardiovascular:  Positive for palpitations.   Psychiatric/Behavioral:  Positive for sleep disturbance. The patient is nervous/anxious.        Objective        Vitals:    09/29/23 1009   BP: 128/78   Pulse: 68   Resp: 16   Temp: 36.1 °C (96.9 °F)        Physical Exam  Diagnoses and all orders for this visit:  Iron deficiency anemia, unspecified iron deficiency anemia type (Primary)  Hypothyroidism,  unspecified type  -     Thyroid Stimulating Hormone  Vitamin D deficiency  -     Vitamin D 25-Hydroxy,Total (for eval of Vitamin D levels)  Prediabetes  -     Hemoglobin A1C  Hyperlipidemia, mixed  -     Comprehensive Metabolic Panel  -     Lipid Panel  Folate deficiency  -     Folate  Malaise and fatigue  -     CBC  Asthma, unspecified asthma severity, unspecified whether complicated, unspecified whether persistent  -     budesonide-formoteroL (Symbicort) 80-4.5 mcg/actuation inhaler; Inhale 2 puffs 2 times a day. RINSE MOUTH AFTER USE       -  Atrial fibrillation episodes.  Palpitations.  Followed by Dr Galo. EP cardiology.  Take Flecainide  as directed.    - Hypertension.  Well-controlled.  Current therapy.  Taking Amlodipine 2.5 mg daily.  Exercise, avoid salt.      S/p Right eye surgery cataract 1/17/2023.   Good results.     - H of  COVID -19 illness 8/26/2022.  Has still cough, some shortness of breath.  Extremely weak.   Could not tolerate medications.  Chest X ray.     -Nasopharyngeal lesion/ Neck mass, Mostly, due to Follicular Lymphoma.  S/p Bx by Dr Booker 7/22/2019.   Evaluated by  Dr Meyer,  has had PET scan done, due to inguinal lymph node biopsy.     -Overactive Bladder.  Stable.  Follow up with urology.     -Bacterial overgrowth syndrome.  Abdominal discomfort.  Bloating.   IBS.  Continue to use  Dicyclomine.  Strict diet.  Follow-up with GI.     -Diabetes type II.  Accu-Cheks, take medications as directed.  Diet, exercise.  Off medications.     - Anxiety, depression.  Follow-up with psychiatry.  Taking escitalopram.     - Asthma.  COPD.  Pro-air as directed.  Use Trelegy, samples are given.     - Health maintenance.  Medicare Wellness Annual Exam is  up to date.  Vaccinations.  Colonoscopy screening is not indicated.  Declined to see GYN.     Hearing loss.  Has bilateral hearing aids.     - Overweight with BMI 29.29  Diet, exercise.  Keep ideal BMI is less than 25.         Brigida Valdez MD

## 2023-09-29 ENCOUNTER — OFFICE VISIT (OUTPATIENT)
Dept: PRIMARY CARE | Facility: CLINIC | Age: 88
End: 2023-09-29
Payer: MEDICARE

## 2023-09-29 VITALS
TEMPERATURE: 96.9 F | BODY MASS INDEX: 29.29 KG/M2 | DIASTOLIC BLOOD PRESSURE: 78 MMHG | WEIGHT: 133 LBS | RESPIRATION RATE: 16 BRPM | HEART RATE: 68 BPM | SYSTOLIC BLOOD PRESSURE: 128 MMHG

## 2023-09-29 DIAGNOSIS — R53.81 MALAISE AND FATIGUE: ICD-10-CM

## 2023-09-29 DIAGNOSIS — D50.9 IRON DEFICIENCY ANEMIA, UNSPECIFIED IRON DEFICIENCY ANEMIA TYPE: Primary | ICD-10-CM

## 2023-09-29 DIAGNOSIS — R73.03 PREDIABETES: ICD-10-CM

## 2023-09-29 DIAGNOSIS — E03.9 HYPOTHYROIDISM, UNSPECIFIED TYPE: ICD-10-CM

## 2023-09-29 DIAGNOSIS — R53.83 MALAISE AND FATIGUE: ICD-10-CM

## 2023-09-29 DIAGNOSIS — E55.9 VITAMIN D DEFICIENCY: ICD-10-CM

## 2023-09-29 DIAGNOSIS — J45.909 ASTHMA, UNSPECIFIED ASTHMA SEVERITY, UNSPECIFIED WHETHER COMPLICATED, UNSPECIFIED WHETHER PERSISTENT (HHS-HCC): ICD-10-CM

## 2023-09-29 DIAGNOSIS — E78.2 HYPERLIPIDEMIA, MIXED: ICD-10-CM

## 2023-09-29 DIAGNOSIS — E53.8 FOLATE DEFICIENCY: ICD-10-CM

## 2023-09-29 PROCEDURE — 1126F AMNT PAIN NOTED NONE PRSNT: CPT | Performed by: INTERNAL MEDICINE

## 2023-09-29 PROCEDURE — 1036F TOBACCO NON-USER: CPT | Performed by: INTERNAL MEDICINE

## 2023-09-29 PROCEDURE — 82306 VITAMIN D 25 HYDROXY: CPT | Performed by: INTERNAL MEDICINE

## 2023-09-29 PROCEDURE — 80053 COMPREHEN METABOLIC PANEL: CPT | Performed by: INTERNAL MEDICINE

## 2023-09-29 PROCEDURE — 1159F MED LIST DOCD IN RCRD: CPT | Performed by: INTERNAL MEDICINE

## 2023-09-29 PROCEDURE — 99214 OFFICE O/P EST MOD 30 MIN: CPT | Performed by: INTERNAL MEDICINE

## 2023-09-29 PROCEDURE — 83036 HEMOGLOBIN GLYCOSYLATED A1C: CPT | Performed by: INTERNAL MEDICINE

## 2023-09-29 PROCEDURE — 85025 COMPLETE CBC W/AUTO DIFF WBC: CPT | Performed by: INTERNAL MEDICINE

## 2023-09-29 PROCEDURE — 3078F DIAST BP <80 MM HG: CPT | Performed by: INTERNAL MEDICINE

## 2023-09-29 PROCEDURE — 80061 LIPID PANEL: CPT | Performed by: INTERNAL MEDICINE

## 2023-09-29 PROCEDURE — 3074F SYST BP LT 130 MM HG: CPT | Performed by: INTERNAL MEDICINE

## 2023-09-29 PROCEDURE — 82746 ASSAY OF FOLIC ACID SERUM: CPT | Performed by: INTERNAL MEDICINE

## 2023-09-29 PROCEDURE — 1160F RVW MEDS BY RX/DR IN RCRD: CPT | Performed by: INTERNAL MEDICINE

## 2023-09-29 PROCEDURE — 84443 ASSAY THYROID STIM HORMONE: CPT | Performed by: INTERNAL MEDICINE

## 2023-09-29 RX ORDER — ESOMEPRAZOLE MAGNESIUM 40 MG/1
40 CAPSULE, DELAYED RELEASE ORAL
COMMUNITY

## 2023-09-29 RX ORDER — BUDESONIDE AND FORMOTEROL FUMARATE DIHYDRATE 80; 4.5 UG/1; UG/1
2 AEROSOL RESPIRATORY (INHALATION) 2 TIMES DAILY
Qty: 10.2 G | Refills: 3 | Status: SHIPPED | OUTPATIENT
Start: 2023-09-29 | End: 2024-06-03

## 2023-09-29 ASSESSMENT — ENCOUNTER SYMPTOMS
ACTIVITY CHANGE: 1
APPETITE CHANGE: 1
NERVOUS/ANXIOUS: 1
PALPITATIONS: 1
SLEEP DISTURBANCE: 1

## 2023-09-29 ASSESSMENT — PAIN SCALES - GENERAL: PAINLEVEL: 0-NO PAIN

## 2023-10-06 ENCOUNTER — APPOINTMENT (OUTPATIENT)
Dept: RADIOLOGY | Facility: HOSPITAL | Age: 88
End: 2023-10-06
Payer: MEDICARE

## 2023-10-17 DIAGNOSIS — I48.0 PAROXYSMAL ATRIAL FIBRILLATION (MULTI): ICD-10-CM

## 2023-10-17 RX ORDER — FLECAINIDE ACETATE 100 MG/1
100 TABLET ORAL EVERY 12 HOURS
Qty: 60 TABLET | Refills: 6 | Status: SHIPPED | OUTPATIENT
Start: 2023-10-17 | End: 2024-05-13

## 2023-10-18 DIAGNOSIS — I10 ESSENTIAL (PRIMARY) HYPERTENSION: ICD-10-CM

## 2023-10-18 DIAGNOSIS — E78.2 HYPERLIPIDEMIA, MIXED: ICD-10-CM

## 2023-10-18 RX ORDER — ATORVASTATIN CALCIUM 20 MG/1
20 TABLET, FILM COATED ORAL DAILY
Qty: 30 TABLET | Refills: 6 | Status: SHIPPED | OUTPATIENT
Start: 2023-10-18

## 2023-10-18 RX ORDER — AMLODIPINE BESYLATE 2.5 MG/1
2.5 TABLET ORAL DAILY
Qty: 30 TABLET | Refills: 6 | Status: SHIPPED | OUTPATIENT
Start: 2023-10-18 | End: 2024-04-16

## 2023-10-23 ENCOUNTER — TELEPHONE (OUTPATIENT)
Dept: HEMATOLOGY/ONCOLOGY | Facility: CLINIC | Age: 88
End: 2023-10-23
Payer: MEDICARE

## 2023-10-23 NOTE — TELEPHONE ENCOUNTER
Dr. Meyer had requested IR guided BX of left inguinal lymph node on 9/11/23 and had spoke to Brigida regarding this who verbalized understanding. We have had difficulty scheduling the BX. The patient had an appointment for it on 10/6/23 but it was canceled. IR scheduling has tried to reach Brigida several times to reschedule without success. I attempted to contact Brigida to discuss however reached VM. Provided phone numbers of both Dr. Meyer's office and IR scheduling ([phone] option #1) and asked her to call us.

## 2023-10-25 ENCOUNTER — OFFICE VISIT (OUTPATIENT)
Dept: PRIMARY CARE | Facility: CLINIC | Age: 88
End: 2023-10-25
Payer: MEDICARE

## 2023-10-25 VITALS
HEART RATE: 62 BPM | WEIGHT: 132 LBS | RESPIRATION RATE: 16 BRPM | DIASTOLIC BLOOD PRESSURE: 76 MMHG | BODY MASS INDEX: 29.07 KG/M2 | SYSTOLIC BLOOD PRESSURE: 126 MMHG | TEMPERATURE: 97.6 F

## 2023-10-25 DIAGNOSIS — K52.9 COLITIS: Primary | ICD-10-CM

## 2023-10-25 DIAGNOSIS — E03.9 HYPOTHYROIDISM, UNSPECIFIED: ICD-10-CM

## 2023-10-25 DIAGNOSIS — K62.89 PROCTITIS: ICD-10-CM

## 2023-10-25 PROCEDURE — 1159F MED LIST DOCD IN RCRD: CPT | Performed by: INTERNAL MEDICINE

## 2023-10-25 PROCEDURE — 1036F TOBACCO NON-USER: CPT | Performed by: INTERNAL MEDICINE

## 2023-10-25 PROCEDURE — 1160F RVW MEDS BY RX/DR IN RCRD: CPT | Performed by: INTERNAL MEDICINE

## 2023-10-25 PROCEDURE — 1126F AMNT PAIN NOTED NONE PRSNT: CPT | Performed by: INTERNAL MEDICINE

## 2023-10-25 PROCEDURE — 3074F SYST BP LT 130 MM HG: CPT | Performed by: INTERNAL MEDICINE

## 2023-10-25 PROCEDURE — 99213 OFFICE O/P EST LOW 20 MIN: CPT | Performed by: INTERNAL MEDICINE

## 2023-10-25 PROCEDURE — 3078F DIAST BP <80 MM HG: CPT | Performed by: INTERNAL MEDICINE

## 2023-10-25 RX ORDER — LEVOTHYROXINE SODIUM 88 UG/1
88 TABLET ORAL DAILY
Qty: 90 TABLET | Refills: 2 | Status: SHIPPED | OUTPATIENT
Start: 2023-10-25 | End: 2024-02-01

## 2023-10-25 ASSESSMENT — PAIN SCALES - GENERAL: PAINLEVEL: 0-NO PAIN

## 2023-10-25 NOTE — TELEPHONE ENCOUNTER
Reached out to patient's PCP Dr. Brigida Valdez via email as patient had an appointment with her today asking her to discuss need for BX with patient and ask her to contact IR scheduling. Dr. Valdez emailed back that patient's  Brigida was trying to reach me regarding the BX. I contacted Brigida who advised that Ms. Martinez doesn't want to proceed with the BX as she says it's ordered on the wrong side. That's why she canceled the 10/6/23 appointment. Dr. Meyer had requested BX of left inguinal lymph node but patient says it should be on the right. She is requesting to meet with Dr. Meyer to discuss further and wants [facility] location. Advised that next available appointment was the end of November but they insisted on [facility]. Pt scheduled with Dr. Meyer on 11/28/23 at 9am. Will update Dr. Meyer and IR.

## 2023-10-25 NOTE — PROGRESS NOTES
Subjective    Sylvia Martinez is a 87 y.o. female who presents for  follow  up after ED visit 10/14/2023 for  colitis.  Weak.  Has abdominal discomfort.    HPI  This is an 87 years old English speaking lady with medical history significant for abdominal pain, gastroesophageal reflux disease, chronic bloating, discomfort, constipation, history of fundoplasty, varicosities, severe low back pain, lumbosacral stenosis, arthralgia, hypothyroidism, diabetes, hyper lipidemia, vitamin D. deficiency, depression, anxiety, headaches.,s/p EGD, colonoscopy 8/13/2018 with small cecal polyp, diverticulosis, gastropathy, s/p Hospitalization 5/14/2021 for arrhythmia, s/p admission to [place] 4/7/2022 to 4/8/2022 with atrial fibrillation, s/p L cataract surgery 6/7/2022, COVID -19 8/26/2022, s/p ED visit 10/14/2023 for colitis, had Rectal and sigmoid wall thickening , concerning for proctitis, colitis.    Patient is presented for follow up after ED visit.  Developed abdominal pain episodes, was evaluated by ED.  Had CT scan done, started on Flagyl and keflex.  Due to GI appointment  Dr Pickard.  Unable to tolerate Flagyl.   Patient was seen by Dr Meyer, due to Bx.  Has had PET scan done.     Stated, that has been taking medications as directed.  No issues.     Denies to have any chest pain, shortness of breath, no fever or chills.  Has no headaches.     Followed by Dr. Galo.     Stated, that followed by the urology.  Blood glucose is stable, controlled by diet.  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  Advised to follow-up in 3 months.  Has lymphadenopathy, stable.   Blood pressure is stable, better controlled.  Has occasional constipations.  Weak at the time.    Review of Systems   Constitutional:  Positive for activity change, appetite change, fatigue and unexpected weight change.   Gastrointestinal:  Positive for abdominal distention and abdominal pain.   Musculoskeletal:  Positive for arthralgias.    Neurological:  Positive for weakness.   Psychiatric/Behavioral:  Positive for sleep disturbance.        Objective        Vitals:    10/25/23 1105   BP: 126/76   Pulse: 62   Resp: 16   Temp: 36.4 °C (97.6 °F)        Physical Exam  HENT:      Head: Normocephalic.      Nose: Nose normal.   Eyes:      Pupils: Pupils are equal, round, and reactive to light.   Cardiovascular:      Rate and Rhythm: Normal rate.   Pulmonary:      Effort: Pulmonary effort is normal.      Breath sounds: Normal breath sounds.   Abdominal:      Palpations: Abdomen is soft.   Musculoskeletal:         General: Tenderness present.   Neurological:      General: No focal deficit present.      Mental Status: She is alert.   Psychiatric:         Mood and Affect: Mood normal.       Diagnoses and all orders for this visit:  Colitis (Primary)  Proctitis  Hypothyroidism, unspecified  -     levothyroxine (Synthroid, Levoxyl) 88 mcg tablet; Take 1 tablet (88 mcg) by mouth once daily.       S/p ED visit 10/14/2023 for proctitis, colitis .  Evaluated by  ED of [facility], CT scan done.  Started on Flagyl and Keflex, was not able to tolerate Flagyl due to nausea.  Has incoming GI appointment with GI DR Pickard.      -Atrial fibrillation episodes.  Palpitations.  Followed by Dr Galo. EP cardiology.  Take Flecainide  as directed.     - Hypertension.  Well-controlled.  Current therapy.  Taking Amlodipine 2.5 mg daily.  Exercise, avoid salt.      S/p Right eye surgery cataract 1/17/2023.   Good results.     - H of  COVID -19 illness 8/26/2022.  Has still cough, some shortness of breath.  Extremely weak.   Could not tolerate medications.  Chest X ray.     CLL  -Nasopharyngeal lesion/ Neck mass, Mostly, due to Follicular Lymphoma.  S/p Bx by Dr Booker 7/22/2019.   Evaluated by  Dr Meyer,  has had PET scan done, due to L inguinal lymph node biopsy.  [phone]     -Overactive Bladder.  Stable.  Follow up with urology.     -Bacterial overgrowth  syndrome.  Abdominal discomfort.  Bloating.   IBS.  Continue to use  Dicyclomine.  Strict diet.  Follow-up with GI.     -Diabetes type II.  Accu-Cheks, take medications as directed.  Diet, exercise.  Off medications.     - Anxiety, depression.  Follow-up with psychiatry.  Taking escitalopram.     - Asthma.  COPD.  Pro-air as directed.  Use Trelegy, samples are given.     - Health maintenance.  Medicare Wellness Annual Exam is  up to date.  Vaccinations.  Colonoscopy screening is not indicated.  Declined to see GYN.     Hearing loss.  Has bilateral hearing aids.     - Overweight with BMI 29.07.  Diet, exercise.  Keep ideal BMI is less than 25.     Brigida Valdez MD

## 2023-10-26 ASSESSMENT — ENCOUNTER SYMPTOMS
ARTHRALGIAS: 1
SLEEP DISTURBANCE: 1
WEAKNESS: 1
ABDOMINAL PAIN: 1
ACTIVITY CHANGE: 1
UNEXPECTED WEIGHT CHANGE: 1
APPETITE CHANGE: 1
FATIGUE: 1
ABDOMINAL DISTENTION: 1

## 2023-11-13 DIAGNOSIS — R10.84 GENERALIZED ABDOMINAL PAIN: ICD-10-CM

## 2023-11-13 RX ORDER — DICYCLOMINE HYDROCHLORIDE 10 MG/1
20 CAPSULE ORAL DAILY
Qty: 180 CAPSULE | Refills: 3 | Status: SHIPPED | OUTPATIENT
Start: 2023-11-13

## 2023-11-28 ENCOUNTER — OFFICE VISIT (OUTPATIENT)
Dept: HEMATOLOGY/ONCOLOGY | Facility: CLINIC | Age: 88
End: 2023-11-28
Payer: MEDICARE

## 2023-11-28 ENCOUNTER — LAB (OUTPATIENT)
Dept: LAB | Facility: CLINIC | Age: 88
End: 2023-11-28
Payer: MEDICARE

## 2023-11-28 VITALS
TEMPERATURE: 98.6 F | SYSTOLIC BLOOD PRESSURE: 125 MMHG | HEART RATE: 62 BPM | BODY MASS INDEX: 29.23 KG/M2 | OXYGEN SATURATION: 97 % | WEIGHT: 132.72 LBS | DIASTOLIC BLOOD PRESSURE: 61 MMHG | RESPIRATION RATE: 18 BRPM

## 2023-11-28 DIAGNOSIS — C82.90 FOLLICULAR LYMPHOMA, UNSPECIFIED FOLLICULAR LYMPHOMA TYPE, UNSPECIFIED BODY REGION (MULTI): Primary | ICD-10-CM

## 2023-11-28 DIAGNOSIS — R59.0 LYMPHADENOPATHY, INGUINAL: ICD-10-CM

## 2023-11-28 DIAGNOSIS — C85.18 B-CELL LYMPHOMA OF LYMPH NODES OF MULTIPLE REGIONS, UNSPECIFIED B-CELL LYMPHOMA TYPE (MULTI): ICD-10-CM

## 2023-11-28 DIAGNOSIS — C82.90 FOLLICULAR LYMPHOMA, UNSPECIFIED FOLLICULAR LYMPHOMA TYPE, UNSPECIFIED BODY REGION (MULTI): ICD-10-CM

## 2023-11-28 LAB
ALBUMIN SERPL BCP-MCNC: 4.3 G/DL (ref 3.4–5)
ALP SERPL-CCNC: 60 U/L (ref 33–136)
ALT SERPL W P-5'-P-CCNC: 15 U/L (ref 7–45)
ANION GAP SERPL CALC-SCNC: 14 MMOL/L (ref 10–20)
AST SERPL W P-5'-P-CCNC: 20 U/L (ref 9–39)
BASOPHILS # BLD MANUAL: 0 X10*3/UL (ref 0–0.1)
BASOPHILS NFR BLD MANUAL: 0 %
BILIRUB SERPL-MCNC: 0.6 MG/DL (ref 0–1.2)
BUN SERPL-MCNC: 16 MG/DL (ref 6–23)
CALCIUM SERPL-MCNC: 9.6 MG/DL (ref 8.6–10.6)
CHLORIDE SERPL-SCNC: 107 MMOL/L (ref 98–107)
CO2 SERPL-SCNC: 25 MMOL/L (ref 21–32)
CREAT SERPL-MCNC: 0.8 MG/DL (ref 0.5–1.05)
EOSINOPHIL # BLD MANUAL: 0.09 X10*3/UL (ref 0–0.4)
EOSINOPHIL NFR BLD MANUAL: 1 %
ERYTHROCYTE [DISTWIDTH] IN BLOOD BY AUTOMATED COUNT: 14.9 % (ref 11.5–14.5)
GFR SERPL CREATININE-BSD FRML MDRD: 71 ML/MIN/1.73M*2
GLUCOSE SERPL-MCNC: 136 MG/DL (ref 74–99)
HCT VFR BLD AUTO: 35.1 % (ref 36–46)
HGB BLD-MCNC: 11 G/DL (ref 12–16)
IMM GRANULOCYTES # BLD AUTO: 0 X10*3/UL (ref 0–0.5)
IMM GRANULOCYTES NFR BLD AUTO: 0 % (ref 0–0.9)
LDH SERPL L TO P-CCNC: 145 U/L (ref 84–246)
LYMPHOCYTES # BLD MANUAL: 5.67 X10*3/UL (ref 0.8–3)
LYMPHOCYTES NFR BLD MANUAL: 63 %
MCH RBC QN AUTO: 28.7 PG (ref 26–34)
MCHC RBC AUTO-ENTMCNC: 31.3 G/DL (ref 32–36)
MCV RBC AUTO: 92 FL (ref 80–100)
MONOCYTES # BLD MANUAL: 0.45 X10*3/UL (ref 0.05–0.8)
MONOCYTES NFR BLD MANUAL: 5 %
NEUTS SEG # BLD MANUAL: 2.61 X10*3/UL (ref 1.6–5)
NEUTS SEG NFR BLD MANUAL: 29 %
NRBC BLD-RTO: ABNORMAL /100{WBCS}
OVALOCYTES BLD QL SMEAR: ABNORMAL
PLATELET # BLD AUTO: 178 X10*3/UL (ref 150–450)
POTASSIUM SERPL-SCNC: 4 MMOL/L (ref 3.5–5.3)
PROT SERPL-MCNC: 6.1 G/DL (ref 6.4–8.2)
RBC # BLD AUTO: 3.83 X10*6/UL (ref 4–5.2)
RBC MORPH BLD: ABNORMAL
SODIUM SERPL-SCNC: 142 MMOL/L (ref 136–145)
TOTAL CELLS COUNTED BLD: 100
VARIANT LYMPHS # BLD MANUAL: 0.18 X10*3/UL (ref 0–0.3)
VARIANT LYMPHS NFR BLD: 2 %
WBC # BLD AUTO: 9 X10*3/UL (ref 4.4–11.3)

## 2023-11-28 PROCEDURE — 99214 OFFICE O/P EST MOD 30 MIN: CPT | Performed by: INTERNAL MEDICINE

## 2023-11-28 PROCEDURE — 1160F RVW MEDS BY RX/DR IN RCRD: CPT | Performed by: INTERNAL MEDICINE

## 2023-11-28 PROCEDURE — 3078F DIAST BP <80 MM HG: CPT | Performed by: INTERNAL MEDICINE

## 2023-11-28 PROCEDURE — 85027 COMPLETE CBC AUTOMATED: CPT

## 2023-11-28 PROCEDURE — 88185 FLOWCYTOMETRY/TC ADD-ON: CPT | Mod: TC | Performed by: INTERNAL MEDICINE

## 2023-11-28 PROCEDURE — 81450 HL NEO GSAP 5-50DNA/DNA&RNA: CPT | Performed by: INTERNAL MEDICINE

## 2023-11-28 PROCEDURE — 3074F SYST BP LT 130 MM HG: CPT | Performed by: INTERNAL MEDICINE

## 2023-11-28 PROCEDURE — 99214 OFFICE O/P EST MOD 30 MIN: CPT | Mod: PO,25 | Performed by: INTERNAL MEDICINE

## 2023-11-28 PROCEDURE — G0452 MOLECULAR PATHOLOGY INTERPR: HCPCS | Performed by: INTERNAL MEDICINE

## 2023-11-28 PROCEDURE — 1126F AMNT PAIN NOTED NONE PRSNT: CPT | Performed by: INTERNAL MEDICINE

## 2023-11-28 PROCEDURE — 1159F MED LIST DOCD IN RCRD: CPT | Performed by: INTERNAL MEDICINE

## 2023-11-28 PROCEDURE — 36415 COLL VENOUS BLD VENIPUNCTURE: CPT

## 2023-11-28 PROCEDURE — 1036F TOBACCO NON-USER: CPT | Performed by: INTERNAL MEDICINE

## 2023-11-28 PROCEDURE — 80053 COMPREHEN METABOLIC PANEL: CPT

## 2023-11-28 PROCEDURE — 88189 FLOWCYTOMETRY/READ 16 & >: CPT | Performed by: PATHOLOGY

## 2023-11-28 PROCEDURE — 36415 COLL VENOUS BLD VENIPUNCTURE: CPT | Mod: TC

## 2023-11-28 PROCEDURE — 83615 LACTATE (LD) (LDH) ENZYME: CPT

## 2023-11-28 PROCEDURE — 85007 BL SMEAR W/DIFF WBC COUNT: CPT

## 2023-11-28 RX ORDER — LANSOPRAZOLE 30 MG/1
30 TABLET, ORALLY DISINTEGRATING, DELAYED RELEASE ORAL
COMMUNITY
End: 2023-11-28 | Stop reason: ENTERED-IN-ERROR

## 2023-11-28 ASSESSMENT — PAIN SCALES - GENERAL: PAINLEVEL: 0-NO PAIN

## 2023-11-28 NOTE — PROGRESS NOTES
Patient ID: Sylvia Martinez is a 88 y.o. female.    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      Patient had a followup CT scan of neck, chest, abdomen and pelvis  on 11/1/21 which shows that the majoriy of laila disease is stable to smaller. The largest node measures 3.2 cm in the short axis as described above. slowly increasing nodes on imaging  6/2022 8/22/23  Stable weight from last visit.  Multiple 2-3 cm cervical notes and 5x5 cm left inguinal nodes increased adenpathy by exam but assymptomatic,   Have ordered PET scan and plan laila biopsy as patient has dual dx of CLL and FCC to determine most appropratie treatment.     PET scan showed diffuse lymphadenopathy with SUV 4.5 to 4.8  with left pelvic side wall mass SUV of 4.5     Follicular lymphoma (CMS/HCC)   4/8/2023 Initial Diagnosis    Follicular lymphoma (CMS/HCC)         Response:     Past Medical History:     Past Medical History:   Diagnosis Date    Abnormal finding of blood chemistry, unspecified     Abnormal blood chemistry    Bladder disorder,  unspecified 08/16/2016    Bladder disorder    Dyspnea, unspecified 10/11/2016    Dyspnea    Other cervical disc degeneration, unspecified cervical region 10/11/2016    Degenerative cervical disc    Other specified anxiety disorders 05/01/2019    Depression with anxiety    Other specified diseases of intestine 06/25/2017    Bacterial overgrowth syndrome    Pain in unspecified joint 07/12/2016    Joint pain    Pain in unspecified joint     Arthralgia    Palpitations 06/01/2016    Palpitations    Personal history of diseases of the blood and blood-forming organs and certain disorders involving the immune mechanism     History of anemia    Personal history of other diseases of the circulatory system 05/02/2017    H/O atrial dilatation    Personal history of other diseases of the circulatory system 10/11/2016    History of hypertension    Personal history of other diseases of the digestive system 05/02/2017    History of acute pancreatitis    Personal history of other diseases of the digestive system 07/12/2016    History of diverticulitis of colon    Personal history of other diseases of the digestive system 10/11/2016    History of hemorrhoids    Personal history of other diseases of the digestive system     History of gastroesophageal reflux (GERD)    Personal history of other diseases of the digestive system 11/08/2016    History of acute pancreatitis    Personal history of other diseases of the digestive system 10/11/2016    History of hiatal hernia    Personal history of other diseases of the musculoskeletal system and connective tissue     History of low back pain    Personal history of other diseases of the musculoskeletal system and connective tissue     History of osteoarthritis    Personal history of other diseases of the musculoskeletal system and connective tissue     History of osteopenia    Personal history of other diseases of the respiratory system 05/02/2017    History of chronic obstructive lung  disease    Personal history of other diseases of the respiratory system 07/12/2016    H/O acute bronchitis    Personal history of other endocrine, nutritional and metabolic disease 05/02/2017    History of diabetes mellitus    Personal history of other endocrine, nutritional and metabolic disease 10/11/2016    History of hypothyroidism    Personal history of other endocrine, nutritional and metabolic disease     History of thyroid disease    Personal history of other endocrine, nutritional and metabolic disease 07/12/2016    History of hyperlipidemia    Personal history of other endocrine, nutritional and metabolic disease 10/11/2016    History of hyperlipidemia    Personal history of other mental and behavioral disorders 07/12/2016    History of depression    Personal history of other mental and behavioral disorders 10/11/2016    History of depression    Personal history of other specified conditions 10/11/2016    History of headache    Personal history of other specified conditions     Personal history of multiple pulmonary nodules    Personal history of other specified conditions 10/11/2016    History of shortness of breath    Personal history of other specified conditions     History of vertigo    Postnasal drip 10/24/2014    Postnasal drip    Presence of external hearing-aid     Wears hearing aid    Unspecified abdominal pain 08/08/2022    Abdominal pain       Surgical History:     Past Surgical History:   Procedure Laterality Date    CHOLECYSTECTOMY  05/02/2017    Cholecystectomy Laparoscopic    COLONOSCOPY  01/30/2014    Colonoscopy (Fiberoptic)    ESOPHAGOGASTRODUODENOSCOPY  10/11/2016    Diagnostic Esophagogastroduodenoscopy    GALLBLADDER SURGERY  10/03/2013    Gallbladder Surgery    HERNIA REPAIR  10/11/2016    Hernia Repair    HYSTERECTOMY  05/02/2017    Hysterectomy    OTHER SURGICAL HISTORY  01/02/2022    Loop recorder insertion    OTHER SURGICAL HISTORY  11/08/2016    Anterior Gastropexy For Hiatal  Hernia    OTHER SURGICAL HISTORY  01/30/2014    Stress Test ECG Performed        Family History:     Family History   Problem Relation Name Age of Onset    Stroke Mother      Diabetes Other FAMILY HISTORY         TYPE 2    Hypertension Other FAMILY HISTORY         ESSENTIAL, BENIGN    Coronary artery disease Other FAMILY HISTORY        Social History:     Social History     Tobacco Use    Smoking status: Never     Passive exposure: Never    Smokeless tobacco: Never   Substance Use Topics    Alcohol use: Never    Drug use: Never      -------------------------------------------------------------------------------------------------------  Subjective       HPI  Here today  with her caregiver Brigida for followup of low grade FCC and CLL.  She has slowly progressive lymphadenopathy, ordered CT scan guided biopsy early 10/2023 however patient cancelled because she though it was ordered for the wrong side (which it wasn't)  Patient generally has been okay and weight has been stable, had some colitis in October treated with flagyl and cipro and repeat CT imaging at outside facility, which did not comment on lympadenopathy.  Overall patient feeling okay, no nightsweats, states her appetite is down but review of chart shows stable wgt of 60 kg since 2/2023.  Only eats two sensible meals a day, do not eat after lunch due to chronic abdominal bloating.  Still makes food for her adult children. .       Review of Systems   All other systems reviewed and are negative.     -------------------------------------------------------------------------------------------------------  Objective   BSA: 1.55 meters squared  /61   Pulse 62   Temp 37 °C (98.6 °F)   Resp 18   Wt 60.2 kg (132 lb 11.5 oz)   SpO2 97%   BMI 29.23 kg/m²     Physical Exam  Constitutional:       Appearance: Normal appearance.      Comments: Petite, alert and conversant   HENT:      Head: Normocephalic and atraumatic.      Nose: Nose normal.   Eyes:       Extraocular Movements: Extraocular movements intact.      Conjunctiva/sclera: Conjunctivae normal.      Pupils: Pupils are equal, round, and reactive to light.   Cardiovascular:      Rate and Rhythm: Normal rate and regular rhythm.   Pulmonary:      Breath sounds: Normal breath sounds.   Abdominal:      General: Abdomen is flat. Bowel sounds are normal.      Palpations: Abdomen is soft.   Musculoskeletal:         General: Normal range of motion.      Right lower le+ Pitting Edema present.      Left lower le+ Pitting Edema present.   Lymphadenopathy:      Cervical: Cervical adenopathy present.      Comments: Bilateral cervical nodes anterior and posterior about 1.5 cm in size, no splenomegally,    Fullness left inguinal nahum about 5x4 cm       Skin:     General: Skin is warm and dry.   Neurological:      General: No focal deficit present.      Mental Status: She is oriented to person, place, and time.   Psychiatric:         Mood and Affect: Mood normal.         Behavior: Behavior normal.         Thought Content: Thought content normal.         Performance Status:  Symptomatic; fully ambulatory  -------------------------------------------------------------------------------------------------------  Assessment/Plan      Patient with dual  diagnosis of CLL and FCC lymphoma with slowly progressive adenopahty and PET imaging does not show any concern for Richters transformation.  Patient is clinically stable and really doesn't want to proceed with a biopsy or any treatment at this time.  Patient has mild lymphocytosis and we'll send peripheral blood today for flow studies to assess for circulating lymphoma vs CLL and genetic markers if indication.  If predominantly CLL could consider BTK treatment vs rituxan    Will continue to follow clinically, discussed ways to increase caloric intake. Send flow on blood to determine CLL vs FCC      RTC:3 months without any imaging sent blood for flow, consider repeat imaging  in the spring          -------------------------------------------------------------------------------------------------------  Liyah Meyer MD

## 2023-11-30 DIAGNOSIS — C85.18 B-CELL LYMPHOMA OF LYMPH NODES OF MULTIPLE REGIONS, UNSPECIFIED B-CELL LYMPHOMA TYPE (MULTI): Primary | ICD-10-CM

## 2023-11-30 LAB
CELL COUNT (BLOOD): 9 X10*3/UL
CELL POPULATIONS: NORMAL
DIAGNOSIS: NORMAL
FLOW DIFFERENTIAL: NORMAL
FLOW TEST ORDERED: NORMAL
LAB TEST METHOD: NORMAL
NUMBER OF CELLS COLLECTED: NORMAL PER TUBE
PATH REPORT.TOTAL CANCER: NORMAL
SIGNATURE COMMENT: NORMAL
SPECIMEN VIABILITY: NORMAL

## 2023-12-15 LAB
ELECTRONICALLY SIGNED BY: NORMAL
LYMPHOID NGS RESULTS: NORMAL

## 2023-12-26 DIAGNOSIS — R10.9 UNSPECIFIED ABDOMINAL PAIN: ICD-10-CM

## 2023-12-26 RX ORDER — PANCRELIPASE LIPASE, PANCRELIPASE AMYLASE, AND PANCRELIPASE PROTEASE 21000; 83900; 54700 [USP'U]/1; [USP'U]/1; [USP'U]/1
CAPSULE, DELAYED RELEASE ORAL
Qty: 90 CAPSULE | Refills: 6 | Status: SHIPPED | OUTPATIENT
Start: 2023-12-26 | End: 2024-01-30 | Stop reason: WASHOUT

## 2023-12-29 ENCOUNTER — OFFICE VISIT (OUTPATIENT)
Dept: PRIMARY CARE | Facility: CLINIC | Age: 88
End: 2023-12-29
Payer: MEDICARE

## 2023-12-29 VITALS
HEIGHT: 57 IN | HEART RATE: 64 BPM | DIASTOLIC BLOOD PRESSURE: 76 MMHG | WEIGHT: 134 LBS | SYSTOLIC BLOOD PRESSURE: 128 MMHG | BODY MASS INDEX: 28.91 KG/M2 | RESPIRATION RATE: 16 BRPM | TEMPERATURE: 96.8 F

## 2023-12-29 DIAGNOSIS — R79.9 ABNORMAL BLOOD CHEMISTRY: ICD-10-CM

## 2023-12-29 DIAGNOSIS — E11.69 TYPE 2 DIABETES MELLITUS WITH OTHER SPECIFIED COMPLICATION, WITHOUT LONG-TERM CURRENT USE OF INSULIN (MULTI): ICD-10-CM

## 2023-12-29 DIAGNOSIS — D50.9 IRON DEFICIENCY ANEMIA, UNSPECIFIED IRON DEFICIENCY ANEMIA TYPE: Primary | ICD-10-CM

## 2023-12-29 DIAGNOSIS — E78.2 HYPERLIPIDEMIA, MIXED: ICD-10-CM

## 2023-12-29 DIAGNOSIS — E55.9 VITAMIN D DEFICIENCY: ICD-10-CM

## 2023-12-29 DIAGNOSIS — E53.8 VITAMIN B12 DEFICIENCY: ICD-10-CM

## 2023-12-29 DIAGNOSIS — E03.9 HYPOTHYROIDISM, UNSPECIFIED TYPE: ICD-10-CM

## 2023-12-29 PROCEDURE — 83036 HEMOGLOBIN GLYCOSYLATED A1C: CPT | Performed by: INTERNAL MEDICINE

## 2023-12-29 PROCEDURE — 1036F TOBACCO NON-USER: CPT | Performed by: INTERNAL MEDICINE

## 2023-12-29 PROCEDURE — 84450 TRANSFERASE (AST) (SGOT): CPT | Performed by: INTERNAL MEDICINE

## 2023-12-29 PROCEDURE — 1160F RVW MEDS BY RX/DR IN RCRD: CPT | Performed by: INTERNAL MEDICINE

## 2023-12-29 PROCEDURE — 80048 BASIC METABOLIC PNL TOTAL CA: CPT | Performed by: INTERNAL MEDICINE

## 2023-12-29 PROCEDURE — 3078F DIAST BP <80 MM HG: CPT | Performed by: INTERNAL MEDICINE

## 2023-12-29 PROCEDURE — 80061 LIPID PANEL: CPT | Performed by: INTERNAL MEDICINE

## 2023-12-29 PROCEDURE — 1159F MED LIST DOCD IN RCRD: CPT | Performed by: INTERNAL MEDICINE

## 2023-12-29 PROCEDURE — 84460 ALANINE AMINO (ALT) (SGPT): CPT | Performed by: INTERNAL MEDICINE

## 2023-12-29 PROCEDURE — 3074F SYST BP LT 130 MM HG: CPT | Performed by: INTERNAL MEDICINE

## 2023-12-29 PROCEDURE — 99213 OFFICE O/P EST LOW 20 MIN: CPT | Performed by: INTERNAL MEDICINE

## 2023-12-29 PROCEDURE — 82306 VITAMIN D 25 HYDROXY: CPT | Performed by: INTERNAL MEDICINE

## 2023-12-29 PROCEDURE — 1126F AMNT PAIN NOTED NONE PRSNT: CPT | Performed by: INTERNAL MEDICINE

## 2023-12-29 PROCEDURE — 85025 COMPLETE CBC W/AUTO DIFF WBC: CPT | Performed by: INTERNAL MEDICINE

## 2023-12-29 PROCEDURE — 84443 ASSAY THYROID STIM HORMONE: CPT | Performed by: INTERNAL MEDICINE

## 2023-12-29 ASSESSMENT — ENCOUNTER SYMPTOMS
SLEEP DISTURBANCE: 1
ARTHRALGIAS: 1
ABDOMINAL DISTENTION: 1
CONSTIPATION: 1
BACK PAIN: 1
DIZZINESS: 1
LIGHT-HEADEDNESS: 1

## 2023-12-29 NOTE — PROGRESS NOTES
Subjective    Sylvia Martinez is a 88 y.o. female who presents for Follow-up (3 months).  Losing weight.  In need for refills.  Has unsteady gait.  Weak.  Patient is here for Blood work.    HPI    This is an 87 years old [place]utanese speaking lady with medical history significant for abdominal pain, gastroesophageal reflux disease, chronic bloating, discomfort, constipation, history of fundoplasty, varicosities, severe low back pain, lumbosacral stenosis, arthralgia, hypothyroidism, diabetes, hyper lipidemia, vitamin D. deficiency, depression, anxiety, headaches.,s/p EGD, colonoscopy 8/13/2018 with small cecal polyp, diverticulosis, gastropathy, s/p Hospitalization 5/14/2021 for arrhythmia, s/p admission to [place] 4/7/2022 to 4/8/2022 with atrial fibrillation, s/p L cataract surgery 6/7/2022, COVID -19 8/26/2022, s/p ED visit 10/14/2023 for colitis, had Rectal and sigmoid wall thickening , concerning for proctitis, colitis.     Patient is presented for follow up .  Has unsteady gait,  Has constipations.  Seen by GI.  Added Miralax.   Followed by  GI  Dr Pickard.      Patient was seen by Dr Meyer, due to Bx.  Has had PET scan done.     Stated, that has been taking medications as directed.  No issues.     Denies to have any chest pain, shortness of breath, no fever or chills.  Has no headaches.     Followed by Dr. Galo.     Stated, that followed by the urology.  Blood glucose is stable, controlled by diet.  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  Advised to follow-up in 3 months.  Has lymphadenopathy, stable.   Blood pressure is stable, better controlled.  Has occasional constipations.  Weak at the time.     Review of Systems   Cardiovascular:  Negative for chest pain and leg swelling.   Gastrointestinal:  Positive for abdominal distention and constipation.   Musculoskeletal:  Positive for arthralgias and back pain.   Neurological:  Positive for dizziness and light-headedness.    Psychiatric/Behavioral:  Positive for sleep disturbance.        Objective        Vitals:    12/29/23 1031   BP: 128/76   Pulse: 64   Resp: 16   Temp: 36 °C (96.8 °F)        Physical Exam  HENT:      Head: Normocephalic.      Nose: Nose normal.      Mouth/Throat:      Mouth: Mucous membranes are moist.   Cardiovascular:      Rate and Rhythm: Normal rate and regular rhythm.   Pulmonary:      Breath sounds: Normal breath sounds.   Abdominal:      Palpations: Abdomen is soft.   Musculoskeletal:         General: Normal range of motion.   Skin:     General: Skin is warm.   Neurological:      General: No focal deficit present.      Mental Status: She is alert.   Psychiatric:         Mood and Affect: Mood normal.       Sylvia was seen today for follow-up.  Diagnoses and all orders for this visit:  Iron deficiency anemia, unspecified iron deficiency anemia type (Primary)  -     CBC  Type 2 diabetes mellitus with other specified complication, without long-term current use of insulin (CMS/[facility])  -     Hemoglobin A1C  Hyperlipidemia, mixed  -     Basic Metabolic Panel  -     Aspartate Aminotransferase  -     Alanine Aminotransferase  -     Lipid Panel  Abnormal blood chemistry  Hypothyroidism, unspecified type  -     Thyroid Stimulating Hormone  Vitamin D deficiency  -     Vitamin D 25-Hydroxy,Total (for eval of Vitamin D levels)  Vitamin B12 deficiency     S/p ED visit 10/14/2023 for proctitis, colitis .  Evaluated by  ED of [facility], CT scan done.  Started on Flagyl and Keflex, was not able to tolerate Flagyl due to nausea.  Has incoming GI appointment with GI DR Pickard.       -Atrial fibrillation episodes.  Palpitations.  Followed by Dr Galo. EP cardiology.  Take Flecainide  as directed.     - Hypertension.  Well-controlled.  Current therapy.  Taking Amlodipine 2.5 mg daily.  Exercise, avoid salt.      S/p Right eye surgery cataract 1/17/2023.   Good results.     - H of  COVID -19 illness 8/26/2022.  Has still  cough, some shortness of breath.  Extremely weak.   Could not tolerate medications.  Chest X ray.     CLL  -Nasopharyngeal lesion/ Neck mass, Mostly, due to Follicular Lymphoma.  S/p Bx by Dr Booker 7/22/2019.   Evaluated by  Dr Meyer,  has had PET scan done, due to L inguinal lymph node biopsy.  [phone]     -Overactive Bladder.  Stable.  Follow up with urology.     -Bacterial overgrowth syndrome.  Abdominal discomfort.  Bloating.   IBS.  Continue to use  Dicyclomine.  Strict diet.  Follow-up with GI.     -Diabetes type II.  Accu-Cheks, take medications as directed.  Diet, exercise.  Off medications.     - Anxiety, depression.  Follow-up with psychiatry.  Taking escitalopram.     - Asthma.  COPD.  Pro-air as directed.  Use Trelegy, samples are given.     - Health maintenance.  Medicare Wellness Annual Exam is  up to date.  Vaccinations.  Colonoscopy screening is not indicated.  Declined to see GYN.     Hearing loss.  Has bilateral hearing aids.     - Overweight with BMI 29.51.  Diet, exercise.  Keep ideal BMI is less than 25.      Brigida Valdez MD

## 2024-01-27 PROCEDURE — G0179 MD RECERTIFICATION HHA PT: HCPCS | Performed by: INTERNAL MEDICINE

## 2024-01-30 ENCOUNTER — TELEMEDICINE (OUTPATIENT)
Dept: PRIMARY CARE | Facility: CLINIC | Age: 89
End: 2024-01-30
Payer: MEDICARE

## 2024-01-30 DIAGNOSIS — R05.9 COUGH, UNSPECIFIED TYPE: Primary | ICD-10-CM

## 2024-01-30 DIAGNOSIS — R10.9 ABDOMINAL PAIN, UNSPECIFIED ABDOMINAL LOCATION: ICD-10-CM

## 2024-01-30 PROCEDURE — 99442 PR PHYS/QHP TELEPHONE EVALUATION 11-20 MIN: CPT | Performed by: INTERNAL MEDICINE

## 2024-01-30 RX ORDER — PANCRELIPASE 24000; 76000; 120000 [USP'U]/1; [USP'U]/1; [USP'U]/1
1 CAPSULE, DELAYED RELEASE PELLETS ORAL
Qty: 90 CAPSULE | Refills: 6 | Status: SHIPPED | OUTPATIENT
Start: 2024-01-30

## 2024-01-30 RX ORDER — AZITHROMYCIN 250 MG/1
TABLET, FILM COATED ORAL
Qty: 6 TABLET | Refills: 0 | Status: SHIPPED | OUTPATIENT
Start: 2024-01-30 | End: 2024-01-30 | Stop reason: SDUPTHER

## 2024-01-30 RX ORDER — AZITHROMYCIN 250 MG/1
TABLET, FILM COATED ORAL
Qty: 6 TABLET | Refills: 0 | Status: SHIPPED | OUTPATIENT
Start: 2024-01-30 | End: 2024-02-04

## 2024-01-30 ASSESSMENT — ENCOUNTER SYMPTOMS
BACK PAIN: 1
APPETITE CHANGE: 1
HEADACHES: 1
COUGH: 1
SLEEP DISTURBANCE: 1
UNEXPECTED WEIGHT CHANGE: 1
FATIGUE: 1
APNEA: 0
ARTHRALGIAS: 1

## 2024-01-30 NOTE — PROGRESS NOTES
Subjective    Sylvia Martinez is a 88 y.o. female who is evaluated Via Telemedicine.  Has chills, congestion.  Weak.    HPI  This is an 88 years old [place] Lankan speaking lady with medical history significant for abdominal pain, gastroesophageal reflux disease, chronic bloating, discomfort, constipation, history of fundoplasty, varicosities, severe low back pain, lumbosacral stenosis, arthralgia, hypothyroidism, diabetes, hyper lipidemia, vitamin D. deficiency, depression, anxiety, headaches.,s/p EGD, colonoscopy 8/13/2018 with small cecal polyp, diverticulosis, gastropathy, s/p Hospitalization 5/14/2021 for arrhythmia, s/p admission to [place] 4/7/2022 to 4/8/2022 with atrial fibrillation, s/p L cataract surgery 6/7/2022, COVID -19 8/26/2022, s/p ED visit 10/14/2023 for colitis, had Rectal and sigmoid wall thickening , concerning for proctitis, colitis.     Patient is evaluated Via Telemedicine.  Has been sick for > 1 week.  Has cough, with yellow sputum formation.  Has unsteady gait,  Has constipations.  Seen by GI.  Added Miralax.   Followed by  GI  Dr Pickard.      Patient was seen by Dr Meyer, due to Bx.  Has had PET scan done.     Stated, that has been taking medications as directed.  No issues.     Denies to have any chest pain, shortness of breath, no fever or chills.  Has no headaches.     Followed by Dr. Galo.     Stated, that followed by the urology.  Blood glucose is stable, controlled by diet.  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  Advised to follow-up in 3 months.  Has lymphadenopathy, stable.   Blood pressure is stable, better controlled.  Has occasional constipations.  Weak at the time.    Review of Systems   Constitutional:  Positive for appetite change, fatigue and unexpected weight change.   Respiratory:  Positive for cough. Negative for apnea.    Musculoskeletal:  Positive for arthralgias and back pain.   Neurological:  Positive for headaches.   Psychiatric/Behavioral:   Positive for sleep disturbance.        Objective      There were no vitals filed for this visit.     Physical Exam  Diagnoses and all orders for this visit:  Cough, unspecified type (Primary)  -     Discontinue: azithromycin (Zithromax) 250 mg tablet; Take 2 tablets (500 mg) by mouth once daily for 1 day, THEN 1 tablet (250 mg) once daily for 4 days. Take 2 tabs (500 mg) by mouth today, than 1 daily for 4 days..  -     azithromycin (Zithromax) 250 mg tablet; Take 2 tablets (500 mg) by mouth once daily for 1 day, THEN 1 tablet (250 mg) once daily for 4 days. Take 2 tabs (500 mg) by mouth today, than 1 daily for 4 days..  Abdominal pain, unspecified abdominal location  -     lipase-protease-amylase (Creon) 24,000-76,000 -120,000 unit capsule; Take 1 capsule by mouth 3 times a day with meals.      - Viral illness.  Patient has congestion, cough with yellow phlegm formation.  Has been having chills, fever.  She is sick since 1/23/2024.  Take plenty of fluids, Tylenol.  Start on Z-Venkat.      S/p ED visit 10/14/2023 for proctitis, colitis .  Evaluated by  ED of [facility], CT scan done.  Started on Flagyl and Keflex, was not able to tolerate Flagyl due to nausea.  Has incoming GI appointment with GI DR Pickard.       -Atrial fibrillation episodes.  Palpitations.  Followed by Dr Galo. EP cardiology.  Take Flecainide  as directed.     - Hypertension.  Well-controlled.  Current therapy.  Taking Amlodipine 2.5 mg daily.  Exercise, avoid salt.      S/p Right eye surgery cataract 1/17/2023.   Good results.     - H of  COVID -19 illness 8/26/2022.  Has still cough, some shortness of breath.  Extremely weak.   Could not tolerate medications.  Chest X ray.     CLL  -Nasopharyngeal lesion/ Neck mass, Mostly, due to Follicular Lymphoma.  S/p Bx by Dr Booker 7/22/2019.   Evaluated by  Dr Meyer,  has had PET scan done, due to L inguinal lymph node biopsy.  [phone]     -Overactive Bladder.  Stable.  Follow up with  urology.     -Bacterial overgrowth syndrome.  Abdominal discomfort.  Bloating.   IBS.  Continue to use  Dicyclomine.  Strict diet.  Follow-up with GI.     -Diabetes type II.  Accu-Cheks, take medications as directed.  Diet, exercise.  Off medications.     - Anxiety, depression.  Follow-up with psychiatry.  Taking escitalopram.     - Asthma.  COPD.  Pro-air as directed.  Use Trelegy, samples are given.     - Health maintenance.  Medicare Wellness Annual Exam is  up to date.  Vaccinations.  Colonoscopy screening is not indicated.  Declined to see GYN.     Hearing loss.  Has bilateral hearing aids.     - Overweight with BMI 29.51.  Diet, exercise.  Keep ideal BMI is less than 25.       This visit was completed via telephone due to the restrictions of the COVID -19 pandemic.  All issues as above were discussed and addressed, but no physical exam was performed.  If it was felt, that the patient should be evaluated in clinic, then they were directed there.  The patient verbally consented to visit.    Total time spent with patient is greater than 14  minutes, more than 50% of the time is spent in direct patient telemedicine, patient consultation and coordination of care.  Patient voiced a full understanding of all treatment plans.  All patient questions has been answered to patient's satisfaction.     Brigida Valdez MD

## 2024-02-01 DIAGNOSIS — E03.9 HYPOTHYROIDISM, UNSPECIFIED: ICD-10-CM

## 2024-02-01 RX ORDER — LEVOTHYROXINE SODIUM 88 UG/1
88 TABLET ORAL DAILY
Qty: 90 TABLET | Refills: 2 | Status: SHIPPED | OUTPATIENT
Start: 2024-02-01

## 2024-02-20 ENCOUNTER — OFFICE VISIT (OUTPATIENT)
Dept: HEMATOLOGY/ONCOLOGY | Facility: CLINIC | Age: 89
End: 2024-02-20
Payer: MEDICARE

## 2024-02-20 ENCOUNTER — LAB (OUTPATIENT)
Dept: LAB | Facility: CLINIC | Age: 89
End: 2024-02-20
Payer: MEDICARE

## 2024-02-20 VITALS
TEMPERATURE: 98.8 F | BODY MASS INDEX: 29.76 KG/M2 | SYSTOLIC BLOOD PRESSURE: 120 MMHG | WEIGHT: 135.14 LBS | HEART RATE: 67 BPM | RESPIRATION RATE: 16 BRPM | DIASTOLIC BLOOD PRESSURE: 65 MMHG

## 2024-02-20 DIAGNOSIS — R59.0 LYMPHADENOPATHY, INGUINAL: ICD-10-CM

## 2024-02-20 DIAGNOSIS — C82.01: ICD-10-CM

## 2024-02-20 DIAGNOSIS — C82.01: Primary | ICD-10-CM

## 2024-02-20 LAB
BASOPHILS # BLD AUTO: 0.05 X10*3/UL (ref 0–0.1)
BASOPHILS NFR BLD AUTO: 0.3 %
EOSINOPHIL # BLD AUTO: 0.12 X10*3/UL (ref 0–0.4)
EOSINOPHIL NFR BLD AUTO: 0.7 %
ERYTHROCYTE [DISTWIDTH] IN BLOOD BY AUTOMATED COUNT: 14.7 % (ref 11.5–14.5)
HCT VFR BLD AUTO: 35.6 % (ref 36–46)
HGB BLD-MCNC: 11.2 G/DL (ref 12–16)
IMM GRANULOCYTES # BLD AUTO: 0.01 X10*3/UL (ref 0–0.5)
IMM GRANULOCYTES NFR BLD AUTO: 0.1 % (ref 0–0.9)
LYMPHOCYTES # BLD AUTO: 13.04 X10*3/UL (ref 0.8–3)
LYMPHOCYTES NFR BLD AUTO: 77.8 %
MCH RBC QN AUTO: 28.7 PG (ref 26–34)
MCHC RBC AUTO-ENTMCNC: 31.5 G/DL (ref 32–36)
MCV RBC AUTO: 91 FL (ref 80–100)
MONOCYTES # BLD AUTO: 0.61 X10*3/UL (ref 0.05–0.8)
MONOCYTES NFR BLD AUTO: 3.6 %
NEUTROPHILS # BLD AUTO: 2.93 X10*3/UL (ref 1.6–5.5)
NEUTROPHILS NFR BLD AUTO: 17.5 %
NRBC BLD-RTO: ABNORMAL /100{WBCS}
OVALOCYTES BLD QL SMEAR: NORMAL
PLATELET # BLD AUTO: 234 X10*3/UL (ref 150–450)
POLYCHROMASIA BLD QL SMEAR: NORMAL
RBC # BLD AUTO: 3.9 X10*6/UL (ref 4–5.2)
RBC MORPH BLD: NORMAL
SCHISTOCYTES BLD QL SMEAR: NORMAL
WBC # BLD AUTO: 16.8 X10*3/UL (ref 4.4–11.3)

## 2024-02-20 PROCEDURE — 1126F AMNT PAIN NOTED NONE PRSNT: CPT | Performed by: INTERNAL MEDICINE

## 2024-02-20 PROCEDURE — 1159F MED LIST DOCD IN RCRD: CPT | Performed by: INTERNAL MEDICINE

## 2024-02-20 PROCEDURE — 83615 LACTATE (LD) (LDH) ENZYME: CPT | Performed by: INTERNAL MEDICINE

## 2024-02-20 PROCEDURE — 99214 OFFICE O/P EST MOD 30 MIN: CPT | Performed by: INTERNAL MEDICINE

## 2024-02-20 PROCEDURE — 85025 COMPLETE CBC W/AUTO DIFF WBC: CPT

## 2024-02-20 PROCEDURE — 36415 COLL VENOUS BLD VENIPUNCTURE: CPT

## 2024-02-20 PROCEDURE — 1036F TOBACCO NON-USER: CPT | Performed by: INTERNAL MEDICINE

## 2024-02-20 PROCEDURE — 3074F SYST BP LT 130 MM HG: CPT | Performed by: INTERNAL MEDICINE

## 2024-02-20 PROCEDURE — 80053 COMPREHEN METABOLIC PANEL: CPT | Performed by: INTERNAL MEDICINE

## 2024-02-20 PROCEDURE — 3078F DIAST BP <80 MM HG: CPT | Performed by: INTERNAL MEDICINE

## 2024-02-20 ASSESSMENT — PAIN SCALES - GENERAL: PAINLEVEL: 0-NO PAIN

## 2024-02-20 NOTE — PROGRESS NOTES
Patient ID: Sylvia Martinez is a 88 y.o. female.    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      Patient had a followup CT scan of neck, chest, abdomen and pelvis  on 11/1/21 which shows that the majoriy of laila disease is stable to smaller. The largest node measures 3.2 cm in the short axis as described above. slowly increasing nodes on imaging  6/2022 8/22/23  Stable weight from last visit.  Multiple 2-3 cm cervical notes and 5x5 cm left inguinal nodes increased adenpathy by exam but assymptomatic,   Have ordered PET scan and plan laila biopsy as patient has dual dx of CLL and FCC to determine most appropratie treatment.     PET scan showed diffuse lymphadenopathy with SUV 4.5 to 4.8  with left pelvic side wall mass SUV of 4.5    Restaging evaluation November 2023    CT scan in ER 10/14/23   1.  Rectal and sigmoid colon wall thickening concerning for   proctitis/colitis.   2. There are a few enlarged lymph nodes noted in the left para-aortic   region, left common iliac chain and  left external iliac chain.   3.  Nonspecific splenic hypodense lesion.   Additional database sent 11/28/23 on peripheral blood:  Analysis of peripheral blood 11/28/28: Immunophenotypic findings consistent with CD5 positive B cell lymphoproliferative disorder, A CD5-negative B cell clone was not observed in this analysis. The phenotype is atypical for chronic lymphocytic leukemia. The differential diagnosis by phenotype includes mantle cell lymphoma and a CD5+ lymphoplasmacytic or marginal zone lymphoma. Previous tissue biopsy studies did not identify expression of Cyclin D1 or SOX11 suggesting mantle cell lymphoma was unlikely.    Focused Lymphoma NGS Results    DISEASE ASSOCIATED GENOMIC FINDINGS:   BIRC3 p.J875Eoe*3 ([id number] c.[id number])  BIRC3 p.N414Ulv*34 ([id number] c.[id number])  NFKBIE p.K728Npf*13 ([id number] c.[id number])      Follicular lymphoma (CMS/HCC)   4/8/2023 Initial Diagnosis    Follicular lymphoma (CMS/HCC)         Response:     Past Medical History:     Past Medical History:   Diagnosis Date    Abnormal finding of blood chemistry, unspecified     Abnormal blood chemistry    Bladder disorder, unspecified 08/16/2016    Bladder disorder    Dyspnea, unspecified 10/11/2016    Dyspnea    Other cervical disc degeneration, unspecified cervical region 10/11/2016    Degenerative cervical disc    Other specified anxiety disorders 05/01/2019    Depression with anxiety    Other specified diseases of intestine 06/25/2017    Bacterial overgrowth syndrome    Pain in unspecified joint 07/12/2016    Joint pain    Pain in unspecified joint     Arthralgia    Palpitations 06/01/2016    Palpitations    Personal history of diseases of the blood and blood-forming organs and certain disorders involving the immune mechanism     History of anemia    Personal history of other diseases of the circulatory system 05/02/2017    H/O atrial dilatation    Personal history of other diseases of the circulatory system  10/11/2016    History of hypertension    Personal history of other diseases of the digestive system 05/02/2017    History of acute pancreatitis    Personal history of other diseases of the digestive system 07/12/2016    History of diverticulitis of colon    Personal history of other diseases of the digestive system 10/11/2016    History of hemorrhoids    Personal history of other diseases of the digestive system     History of gastroesophageal reflux (GERD)    Personal history of other diseases of the digestive system 11/08/2016    History of acute pancreatitis    Personal history of other diseases of the digestive system 10/11/2016    History of hiatal hernia    Personal history of other diseases of the musculoskeletal system and connective tissue     History of low back pain    Personal history of other diseases of the musculoskeletal system and connective tissue     History of osteoarthritis    Personal history of other diseases of the musculoskeletal system and connective tissue     History of osteopenia    Personal history of other diseases of the respiratory system 05/02/2017    History of chronic obstructive lung disease    Personal history of other diseases of the respiratory system 07/12/2016    H/O acute bronchitis    Personal history of other endocrine, nutritional and metabolic disease 05/02/2017    History of diabetes mellitus    Personal history of other endocrine, nutritional and metabolic disease 10/11/2016    History of hypothyroidism    Personal history of other endocrine, nutritional and metabolic disease     History of thyroid disease    Personal history of other endocrine, nutritional and metabolic disease 07/12/2016    History of hyperlipidemia    Personal history of other endocrine, nutritional and metabolic disease 10/11/2016    History of hyperlipidemia    Personal history of other mental and behavioral disorders 07/12/2016    History of depression    Personal history of other mental and  behavioral disorders 10/11/2016    History of depression    Personal history of other specified conditions 10/11/2016    History of headache    Personal history of other specified conditions     Personal history of multiple pulmonary nodules    Personal history of other specified conditions 10/11/2016    History of shortness of breath    Personal history of other specified conditions     History of vertigo    Postnasal drip 10/24/2014    Postnasal drip    Presence of external hearing-aid     Wears hearing aid    Unspecified abdominal pain 08/08/2022    Abdominal pain       Surgical History:     Past Surgical History:   Procedure Laterality Date    CHOLECYSTECTOMY  05/02/2017    Cholecystectomy Laparoscopic    COLONOSCOPY  01/30/2014    Colonoscopy (Fiberoptic)    ESOPHAGOGASTRODUODENOSCOPY  10/11/2016    Diagnostic Esophagogastroduodenoscopy    GALLBLADDER SURGERY  10/03/2013    Gallbladder Surgery    HERNIA REPAIR  10/11/2016    Hernia Repair    HYSTERECTOMY  05/02/2017    Hysterectomy    OTHER SURGICAL HISTORY  01/02/2022    Loop recorder insertion    OTHER SURGICAL HISTORY  11/08/2016    Anterior Gastropexy For Hiatal Hernia    OTHER SURGICAL HISTORY  01/30/2014    Stress Test ECG Performed        Family History:     Family History   Problem Relation Name Age of Onset    Stroke Mother      Diabetes Other FAMILY HISTORY         TYPE 2    Hypertension Other FAMILY HISTORY         ESSENTIAL, BENIGN    Coronary artery disease Other FAMILY HISTORY        Social History:     Social History     Tobacco Use    Smoking status: Never     Passive exposure: Never    Smokeless tobacco: Never   Vaping Use    Vaping Use: Never used   Substance Use Topics    Alcohol use: Never    Drug use: Never      -------------------------------------------------------------------------------------------------------  Subjective       HPI  89 yo woman here today  with her caregiver Brigida for followup of low grade  b cell lymphoma,  recall  patient declined laila biopsy in the fall ordered for progressive pelvic adenopathy and ongoing weight loss.  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     Since last visit she has actually been doing very well, weight is stable. Patient was seen in the ER for colitis in October 2023  treated with cipro and flagyl and a CT of abdomen and pelvis done at that time shows a few enlarged nodes in left paraaortic, iliac and external iliac chain 1-2 cm in size.        CT scan in ER 10/14/23     1.  Rectal and sigmoid colon wall thickening concerning for   proctitis/colitis.   2. There are a few enlarged lymph nodes noted in the left para-aortic   region, left common iliac chain and left external iliac chain.   3.  Nonspecific splenic hypodense lesion.   Additional database sent 11/28/23 on peripheral blood:   Immunophenotypic findings consistent with CD5 positive B cell lymphoproliferative disorder, see note.       Note: A CD5-negative B cell clone was not observed in this analysis. The phenotype is atypical for chronic lymphocytic leukemia. The differential diagnosis by phenotype includes mantle cell lymphoma and a CD5+ lymphoplasmacytic or marginal zone lymphoma. Previous tissue biopsy studies did not identify expression of Cyclin D1 or SOX11 suggesting mantle cell lymphoma was unlikely.    Focused Lymphoma NGS Results    DISEASE ASSOCIATED GENOMIC FINDINGS:   BIRC3 p.V057Trb*3 ([id number] c.[id number])  BIRC3 p.O354Rcd*34 ([id number] c.[id number])  NFKBIE p.P577Cig*13 ([id number] c.[id number])       Review of Systems   All other systems reviewed and are negative.     -------------------------------------------------------------------------------------------------------  Objective   BSA: 1.56 meters squared  /65   Pulse 67   Temp 37.1 °C (98.8 °F)   Resp 16   Wt 61.3 kg (135 lb 2.3 oz)   BMI 29.76 kg/m²     Physical  Exam  Constitutional:       Appearance: Normal appearance.      Comments: Petite, alert and conversant   HENT:      Head: Normocephalic and atraumatic.      Nose: Nose normal.   Eyes:      Extraocular Movements: Extraocular movements intact.      Conjunctiva/sclera: Conjunctivae normal.      Pupils: Pupils are equal, round, and reactive to light.   Cardiovascular:      Rate and Rhythm: Normal rate and regular rhythm.   Pulmonary:      Breath sounds: Normal breath sounds.   Abdominal:      General: Abdomen is flat. Bowel sounds are normal.      Palpations: Abdomen is soft.   Musculoskeletal:         General: Normal range of motion.      Right lower le+ Pitting Edema present.      Left lower le+ Pitting Edema present.   Lymphadenopathy:      Cervical: Cervical adenopathy present.      Comments: Bilateral cervical nodes anterior and posterior about 1.5 cm in size, no splenomegally,    Fullness left inguinal nahum about 5x4 cm       Skin:     General: Skin is warm and dry.   Neurological:      General: No focal deficit present.      Mental Status: She is oriented to person, place, and time.   Psychiatric:         Mood and Affect: Mood normal.         Behavior: Behavior normal.         Thought Content: Thought content normal.         Performance Status:  Symptomatic; fully ambulatory      -------------------------------------------------------------------------------------------------------  Assessment/Plan      89 yo Patient with  reported dual  diagnosis of CLL and FCC , however recent flow actually suggests marginal zone ( see above) which is more consistent with her clinical course of indolent disease and circulating lymphocytosis.  CT imaging and clinically stable and weight is better so will continue watchful waiting.  If treatment needed would favor single agent rituxan, although a BTK inhibitor would also be appropriate.     RTC: 4  months with blood work  o        -------------------------------------------------------------------------------------------------------  Liyah Meyer MD

## 2024-02-20 NOTE — PATIENT INSTRUCTIONS
Special tests on your blood showed a type of lymphoma called marginal zone lymphoma which is a very slow growing lymphoma, at this time we can continue watching you without treatment since you are doing okay.    I would recommend trying metamucil, 1 packet a day with a cup of water to help with your bowels.

## 2024-02-21 LAB
ALBUMIN SERPL BCP-MCNC: 4.1 G/DL (ref 3.4–5)
ALP SERPL-CCNC: 62 U/L (ref 33–136)
ALT SERPL W P-5'-P-CCNC: 12 U/L (ref 7–45)
ANION GAP SERPL CALC-SCNC: 13 MMOL/L (ref 10–20)
AST SERPL W P-5'-P-CCNC: 18 U/L (ref 9–39)
BILIRUB SERPL-MCNC: 0.4 MG/DL (ref 0–1.2)
BUN SERPL-MCNC: 22 MG/DL (ref 6–23)
CALCIUM SERPL-MCNC: 9.6 MG/DL (ref 8.6–10.6)
CHLORIDE SERPL-SCNC: 106 MMOL/L (ref 98–107)
CO2 SERPL-SCNC: 28 MMOL/L (ref 21–32)
CREAT SERPL-MCNC: 0.71 MG/DL (ref 0.5–1.05)
EGFRCR SERPLBLD CKD-EPI 2021: 82 ML/MIN/1.73M*2
GLUCOSE SERPL-MCNC: 99 MG/DL (ref 74–99)
LDH SERPL L TO P-CCNC: 133 U/L (ref 84–246)
POTASSIUM SERPL-SCNC: 4.3 MMOL/L (ref 3.5–5.3)
PROT SERPL-MCNC: 6.5 G/DL (ref 6.4–8.2)
SODIUM SERPL-SCNC: 143 MMOL/L (ref 136–145)

## 2024-03-19 DIAGNOSIS — E55.9 VITAMIN D DEFICIENCY: ICD-10-CM

## 2024-03-19 RX ORDER — NICOTINE 11MG/24HR
PATCH, TRANSDERMAL 24 HOURS TRANSDERMAL DAILY
Qty: 30 CAPSULE | Refills: 3 | Status: SHIPPED | OUTPATIENT
Start: 2024-03-19

## 2024-03-19 RX ORDER — CHOLECALCIFEROL (VITAMIN D3) 50 MCG
TABLET ORAL
Qty: 30 TABLET | Refills: 3 | Status: SHIPPED | OUTPATIENT
Start: 2024-03-19

## 2024-03-21 NOTE — PROGRESS NOTES
I had the pleasure seeing Sylvia Martinez     Chief Complaint   Patient presents with    Atrial Fibrillation    Palpitations     She is here for a 6 month follow-up visit.     [id number] Score: 5   (Age, sex, htn, dm)     Current Outpatient Medications   Medication Instructions    albuterol 90 mcg/actuation inhaler 2 puffs, inhalation, Every 6 hours PRN    amLODIPine (NORVASC) 2.5 mg, oral, Daily    apixaban (ELIQUIS) 5 mg, oral, Every 12 hours    atorvastatin (LIPITOR) 20 mg, oral, Daily    blood sugar diagnostic (Truetest Test Strips) strip Use as directed    budesonide-formoteroL (Symbicort) 80-4.5 mcg/actuation inhaler 2 puffs, inhalation, 2 times daily, RINSE MOUTH AFTER USE    cholecalciferol (Vitamin D-3) 50 MCG (2000 UT) tablet TAKE ONE TABLET BY MOUTH DAILY    cholecalciferol (Vitamin D3) 50 mcg (2,000 unit) capsule oral, Daily    dicyclomine (BENTYL) 20 mg, oral, Daily    docusate sodium (Colace) 100 mg capsule 1 capsule, oral, 2 times daily    escitalopram (Lexapro) 5 mg tablet 1 tablet, oral, Daily    esomeprazole (NEXIUM) 40 mg, oral, Daily RT    flecainide (TAMBOCOR) 100 mg, oral, Every 12 hours    folic acid-vit B6-vit B12 2.2-25-1 mg tablet 1 tablet, oral, Every morning    levothyroxine (SYNTHROID, LEVOXYL) 88 mcg, oral, Daily    lipase-protease-amylase (Creon) 24,000-76,000 -120,000 unit capsule 1 capsule, oral, 3 times daily with meals        Sylvia Martinez is a 88 y.o. with:     HTN  Diabetes  Celiac's   Presyncopal - s/p [employer] LOOP Recorder Implant (FEB 27, 2020)   Palpitations - several episodes of sudden onset of palpitations and feeling very namely arthritis related.in the last month she well and presyncopal. In she was not doing anything during those episodes and did not provoke the symptoms by anything in particular. Her pulse was between 100 and 140 beats per minute and then the symptoms disappear on themselves. She did have some lightheadedness and dizziness and was very  uncomfortable during those episodes. Because we suspected atrial fibrillation we did place 90 days event monitor. It was placed around 07/20/2015. It is unclear wether she know how to use it. ECHO that was done 07/21/2015 showed preserved structure and function of the heart. I did see her 09/15/2015 and she was wondering if she should continue using the monitor. As there was no diagnosis we decided to wait till the 90 days. She returns, claims that she had occasional palpitations but nothing has been recorded. I suspect she did not know how to use the monitor. Recently she had more frequent episodes of palpitations prompting ER visits. However they have terminated. Last significant episode was on June 18 to 19th, 2016 when we decided to place a Ziopatch. She did have one episode of it and it clearly shows AF. Her rates are rapid and she does have low blood pressure with it. In July of 2016 we prescribed Apixaban and Flecainide. She is taking Flecainide 75 mg twice a day. She was doing very well. Recently however she started having episodes of palpitations but a bit more regular. She does not feel well when she has them. She had recently Ziopatch (September of 2019) and it did not reveal any episodes of af. She claims her episodes last half an hour to an hour.      JAN 2022: She had two episodes in December of 2021 after her booster dose. She has also been prescribed a medication for the bladder that interferes with her Flecainide. Continue Flecainide. We will also try to supplement the bladder medication with something that does not interfere with the Flecainide.     APRIL 2022: She has unfortunately reverted into AF. She had been in [place].     MAY 13th, 2022: Cardioversion done by me  Aug 2022: She is maintaining NSR after the cardioversion in May of 2022. She feels much better. Continue the current Flecainide 100 mg twice a day and anticoagulation therapy.     Feb 2023: I am pleased she is still in NSR. She  "had questions regarding stopping the Flecainide but I did emphasize that there is a very high chance that she might revert into atrial fibrillation. She has lost some weight but she is following up with oncology and her primary care physician.     TESTING:      -TTE: (July 2015) LVEF 75%. LV impaired relaxation pattern diastolic filling.      -MONITOR: (July 2015) 90 day basic rhythm was sinus with rates from 64-67 bpm. No ectopic complexes.       Objective   Physical Exam  /69 (BP Location: Left arm)   Pulse 69   Ht 1.5 m (4' 11.06\")   Wt 61.7 kg (136 lb)   SpO2 97%   BMI 27.42 kg/m²     General Appearance:  Alert, cooperative, no distress, appears stated age   Head:  Normocephalic, without obvious abnormality, atraumatic   Eyes:  PERRL, conjunctiva/corneas clear, EOM's intact, fundi benign, both eyes   Ears:  Normal TM's and external ear canals, both ears   Nose: Nares normal, septum midline,mucosa normal, no drainage or sinus tenderness   Throat: Lips, mucosa, and tongue normal; teeth and gums normal   Neck: Supple, symmetrical, trachea midline, no adenopathy;  thyroid: not enlarged, symmetric, no tenderness/mass/nodules; no carotid bruit or JVD   Back:   Symmetric, no curvature, ROM normal, no CVA tenderness   Lungs:   Clear to auscultation bilaterally, respirations unlabored   Breasts:  No masses or tenderness   Heart:  Regular rate and rhythm, S1 and S2 normal, no murmur, rub, or gallop   Abdomen:   Soft, non-tender, bowel sounds active all four quadrants,  no masses, no organomegaly   Pelvic: Deferred   Extremities: Extremities normal, atraumatic, no cyanosis or edema   Pulses: 2+ and symmetric   Skin: Skin color, texture, turgor normal, no rashes or lesions   Lymph nodes: Cervical, supraclavicular, and axillary nodes normal   Neurologic: Normal           Assessment/Plan   She is doing very well. ECG shows normal and unchanged intervals. We will continue the current dose of Flecainide.             "

## 2024-03-22 ENCOUNTER — OFFICE VISIT (OUTPATIENT)
Dept: CARDIOLOGY | Facility: HOSPITAL | Age: 89
End: 2024-03-22
Payer: MEDICARE

## 2024-03-22 VITALS
WEIGHT: 136 LBS | HEIGHT: 59 IN | BODY MASS INDEX: 27.42 KG/M2 | OXYGEN SATURATION: 97 % | HEART RATE: 69 BPM | SYSTOLIC BLOOD PRESSURE: 129 MMHG | DIASTOLIC BLOOD PRESSURE: 69 MMHG

## 2024-03-22 DIAGNOSIS — I48.0 PAROXYSMAL ATRIAL FIBRILLATION (MULTI): ICD-10-CM

## 2024-03-22 DIAGNOSIS — F33.40 MAJOR DEPRESSIVE DISORDER, RECURRENT, IN REMISSION, UNSPECIFIED (CMS-HCC): ICD-10-CM

## 2024-03-22 DIAGNOSIS — R00.2 PALPITATIONS: ICD-10-CM

## 2024-03-22 DIAGNOSIS — K86.1 OTHER CHRONIC PANCREATITIS (MULTI): Primary | ICD-10-CM

## 2024-03-22 DIAGNOSIS — F33.2 MAJOR DEPRESSIVE DISORDER, RECURRENT SEVERE WITHOUT PSYCHOTIC FEATURES (MULTI): ICD-10-CM

## 2024-03-22 DIAGNOSIS — E11.69 TYPE 2 DIABETES MELLITUS WITH OTHER SPECIFIED COMPLICATION, WITHOUT LONG-TERM CURRENT USE OF INSULIN (MULTI): ICD-10-CM

## 2024-03-22 PROCEDURE — 3078F DIAST BP <80 MM HG: CPT | Performed by: INTERNAL MEDICINE

## 2024-03-22 PROCEDURE — 99214 OFFICE O/P EST MOD 30 MIN: CPT | Performed by: INTERNAL MEDICINE

## 2024-03-22 PROCEDURE — 1036F TOBACCO NON-USER: CPT | Performed by: INTERNAL MEDICINE

## 2024-03-22 PROCEDURE — 1126F AMNT PAIN NOTED NONE PRSNT: CPT | Performed by: INTERNAL MEDICINE

## 2024-03-22 PROCEDURE — 1159F MED LIST DOCD IN RCRD: CPT | Performed by: INTERNAL MEDICINE

## 2024-03-22 PROCEDURE — 3074F SYST BP LT 130 MM HG: CPT | Performed by: INTERNAL MEDICINE

## 2024-03-22 PROCEDURE — 93005 ELECTROCARDIOGRAM TRACING: CPT | Performed by: INTERNAL MEDICINE

## 2024-03-22 PROCEDURE — 93010 ELECTROCARDIOGRAM REPORT: CPT | Performed by: INTERNAL MEDICINE

## 2024-03-22 PROCEDURE — 1160F RVW MEDS BY RX/DR IN RCRD: CPT | Performed by: INTERNAL MEDICINE

## 2024-03-22 ASSESSMENT — CHA2DS2 SCORE
CHA2D2S VASC SCORE: 5
AGE IN YEARS: 75+
HYPERTENSION: YES
CHF OR LEFT VENTRICULAR DYSFUNCTION: NO
PRIOR STROKE OR TIA OR THROMBOEMBOLISM: NO
VASCULAR DISEASE: NO
SEX: FEMALE
DIABETES: YES

## 2024-03-22 ASSESSMENT — COLUMBIA-SUICIDE SEVERITY RATING SCALE - C-SSRS
6. HAVE YOU EVER DONE ANYTHING, STARTED TO DO ANYTHING, OR PREPARED TO DO ANYTHING TO END YOUR LIFE?: NO
1. IN THE PAST MONTH, HAVE YOU WISHED YOU WERE DEAD OR WISHED YOU COULD GO TO SLEEP AND NOT WAKE UP?: NO
2. HAVE YOU ACTUALLY HAD ANY THOUGHTS OF KILLING YOURSELF?: NO

## 2024-03-22 ASSESSMENT — PATIENT HEALTH QUESTIONNAIRE - PHQ9
SUM OF ALL RESPONSES TO PHQ9 QUESTIONS 1 AND 2: 0
1. LITTLE INTEREST OR PLEASURE IN DOING THINGS: NOT AT ALL
2. FEELING DOWN, DEPRESSED OR HOPELESS: NOT AT ALL

## 2024-03-22 ASSESSMENT — PAIN SCALES - GENERAL: PAINLEVEL: 0-NO PAIN

## 2024-03-29 ENCOUNTER — OFFICE VISIT (OUTPATIENT)
Dept: PRIMARY CARE | Facility: CLINIC | Age: 89
End: 2024-03-29
Payer: MEDICARE

## 2024-03-29 VITALS
RESPIRATION RATE: 16 BRPM | HEIGHT: 57 IN | BODY MASS INDEX: 29.34 KG/M2 | HEART RATE: 64 BPM | WEIGHT: 136 LBS | SYSTOLIC BLOOD PRESSURE: 122 MMHG | DIASTOLIC BLOOD PRESSURE: 68 MMHG | TEMPERATURE: 97.3 F

## 2024-03-29 DIAGNOSIS — K59.00 CONSTIPATION, UNSPECIFIED CONSTIPATION TYPE: Primary | ICD-10-CM

## 2024-03-29 PROCEDURE — 3074F SYST BP LT 130 MM HG: CPT | Performed by: INTERNAL MEDICINE

## 2024-03-29 PROCEDURE — 99213 OFFICE O/P EST LOW 20 MIN: CPT | Performed by: INTERNAL MEDICINE

## 2024-03-29 PROCEDURE — 1159F MED LIST DOCD IN RCRD: CPT | Performed by: INTERNAL MEDICINE

## 2024-03-29 PROCEDURE — 3078F DIAST BP <80 MM HG: CPT | Performed by: INTERNAL MEDICINE

## 2024-03-29 PROCEDURE — 1160F RVW MEDS BY RX/DR IN RCRD: CPT | Performed by: INTERNAL MEDICINE

## 2024-03-29 NOTE — PROGRESS NOTES
Subjective    Sylvia Martinez is a 88 y.o. female who presents for follow up.  Has bloating.  Complaining of poor appetite.  Weak.     HPI    This is an 88 years old Norwegian speaking lady with medical history significant for abdominal pain, gastroesophageal reflux disease, chronic bloating, discomfort, constipation, history of fundoplasty, varicosities, severe low back pain, lumbosacral stenosis, arthralgia, hypothyroidism, diabetes, hyper lipidemia, vitamin D. deficiency, depression, anxiety, headaches.,s/p EGD, colonoscopy 8/13/2018 with small cecal polyp, diverticulosis, gastropathy, s/p Hospitalization 5/14/2021 for arrhythmia, s/p admission to [place] 4/7/2022 to 4/8/2022 with atrial fibrillation, s/p L cataract surgery 6/7/2022, COVID -19 8/26/2022, s/p ED visit 10/14/2023 for colitis, had Rectal and sigmoid wall thickening , concerning for proctitis, colitis.    Patient is  presented  for follow up.  Has bloating, abdominal discomfort.  Has malaise, fatigue.  Unable to use Miralax.  Trial of Linzess.   Followed by  GI  Dr Pickard.       by Dr Meyer, due to Bx.  Has had PET scan done.     Stated, that has been taking medications as directed.  No issues.     Denies to have any chest pain, shortness of breath, no fever or chills.  Has no headaches.     Followed by Dr. Galo.     Stated, that followed by the urology.  Blood glucose is stable, controlled by diet.  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  Advised to follow-up in 3 months.  Has lymphadenopathy, stable.   Blood pressure is stable, better controlled.  Has occasional constipations.  Weak at the time.    Review of Systems   Constitutional:  Positive for activity change, appetite change and fatigue.   Gastrointestinal:  Positive for abdominal distention and nausea.   Psychiatric/Behavioral:  Positive for sleep disturbance. The patient is nervous/anxious.        Objective        Vitals:    03/29/24 1045   BP: 122/68   Pulse: 64    Resp: 16   Temp: 36.3 °C (97.3 °F)        Physical Exam  HENT:      Head: Normocephalic.      Mouth/Throat:      Mouth: Mucous membranes are moist.   Cardiovascular:      Rate and Rhythm: Normal rate and regular rhythm.   Pulmonary:      Breath sounds: Normal breath sounds.   Abdominal:      Palpations: Abdomen is soft.   Musculoskeletal:         General: Normal range of motion.   Skin:     General: Skin is warm.   Neurological:      Mental Status: She is alert.       Diagnoses and all orders for this visit:  Constipation, unspecified constipation type (Primary)  -     linaCLOtide (Linzess) 72 mcg capsule; Take 1 capsule (72 mcg) by mouth once daily in the morning. Take before meals. Do not crush or chew.   Stop using Miralax, due to bloating.      -Atrial fibrillation episodes.  Palpitations.  Followed by Dr Galo,  EP cardiology 3/22/35949.  Return in 6 month.  Take Flecainide  as directed.     CLL/ Grade 1 follicular lymphoma.  -Nasopharyngeal lesion/ Neck mass, Mostly, due to Follicular Lymphoma.  S/p Bx by Dr Booker 7/22/2019.  Follow by  Dr Meyer,  has had PET scan done .    - Hypertension.  Well-controlled.  Current therapy.  Taking Amlodipine 2.5 mg daily.  Exercise, avoid salt.     S/p ED visit 10/14/2023 for proctitis, colitis .  Evaluated by  ED of [facility], CT scan done.  Started on Flagyl and Keflex, was not able to tolerate Flagyl due to nausea.  Has incoming GI appointment with GI DR Pickard.     as directed.      S/p Right eye surgery cataract 1/17/2023.   Good results.     - H of  COVID -19 illness 8/26/2022.  Has still cough, some shortness of breath.  Extremely weak.   Could not tolerate medications.  Chest X ray.     -Overactive Bladder.  Stable.  Follow up with urology.     -Bacterial overgrowth syndrome.  Abdominal discomfort.  Bloating.   IBS.  Continue to use  Dicyclomine.  Strict diet.  Follow-up with GI.     -Diabetes type II.  Accu-Cheks, take medications as directed.  Diet,  exercise.  Off medications.     - Anxiety, depression.  Follow-up with psychiatry.  Taking escitalopram.     - Asthma.  COPD.  Pro-air as directed.  Use Trelegy, samples are given.     - Health maintenance.  Return for Medicare Wellness Annual Exam.  Colonoscopy screening is not indicated.  Declined to see GYN.     Hearing loss.  Has bilateral hearing aids.     - Overweight with BMI 29. 43.  Diet, exercise.  Keep ideal BMI is less than 25.      Brigida Valdez MD

## 2024-03-30 ASSESSMENT — ENCOUNTER SYMPTOMS
SLEEP DISTURBANCE: 1
ACTIVITY CHANGE: 1
NAUSEA: 1
NERVOUS/ANXIOUS: 1
APPETITE CHANGE: 1
FATIGUE: 1
ABDOMINAL DISTENTION: 1

## 2024-04-03 LAB
ATRIAL RATE: 65 BPM
P AXIS: 62 DEGREES
P OFFSET: 134 MS
P ONSET: 80 MS
PR INTERVAL: 248 MS
Q ONSET: 204 MS
QRS COUNT: 11 BEATS
QRS DURATION: 112 MS
QT INTERVAL: 434 MS
QTC CALCULATION(BAZETT): 451 MS
QTC FREDERICIA: 445 MS
R AXIS: -4 DEGREES
T AXIS: 27 DEGREES
T OFFSET: 421 MS
VENTRICULAR RATE: 65 BPM

## 2024-04-16 DIAGNOSIS — I48.0 PAROXYSMAL ATRIAL FIBRILLATION (MULTI): ICD-10-CM

## 2024-04-16 DIAGNOSIS — I10 ESSENTIAL (PRIMARY) HYPERTENSION: ICD-10-CM

## 2024-04-16 DIAGNOSIS — E11.69 TYPE 2 DIABETES MELLITUS WITH OTHER SPECIFIED COMPLICATION (MULTI): ICD-10-CM

## 2024-04-16 RX ORDER — AMLODIPINE BESYLATE 2.5 MG/1
2.5 TABLET ORAL DAILY
Qty: 30 TABLET | Refills: 6 | Status: SHIPPED | OUTPATIENT
Start: 2024-04-16 | End: 2024-05-02 | Stop reason: SDUPTHER

## 2024-04-16 RX ORDER — APIXABAN 5 MG/1
5 TABLET, FILM COATED ORAL EVERY 12 HOURS
Qty: 60 TABLET | Refills: 6 | Status: SHIPPED | OUTPATIENT
Start: 2024-04-16 | End: 2024-05-01

## 2024-04-16 RX ORDER — BLOOD-GLUCOSE METER
EACH MISCELLANEOUS
Qty: 100 STRIP | Refills: 6 | Status: SHIPPED | OUTPATIENT
Start: 2024-04-16

## 2024-05-02 ENCOUNTER — OFFICE VISIT (OUTPATIENT)
Dept: PRIMARY CARE | Facility: CLINIC | Age: 89
End: 2024-05-02
Payer: MEDICARE

## 2024-05-02 VITALS
RESPIRATION RATE: 16 BRPM | TEMPERATURE: 97.5 F | HEART RATE: 62 BPM | HEIGHT: 57 IN | WEIGHT: 132 LBS | DIASTOLIC BLOOD PRESSURE: 76 MMHG | BODY MASS INDEX: 28.48 KG/M2 | SYSTOLIC BLOOD PRESSURE: 118 MMHG

## 2024-05-02 DIAGNOSIS — E11.69 TYPE 2 DIABETES MELLITUS WITH OTHER SPECIFIED COMPLICATION, WITHOUT LONG-TERM CURRENT USE OF INSULIN (MULTI): ICD-10-CM

## 2024-05-02 DIAGNOSIS — I48.0 PAROXYSMAL ATRIAL FIBRILLATION (MULTI): ICD-10-CM

## 2024-05-02 DIAGNOSIS — E78.2 HYPERLIPIDEMIA, MIXED: ICD-10-CM

## 2024-05-02 DIAGNOSIS — D50.9 IRON DEFICIENCY ANEMIA, UNSPECIFIED IRON DEFICIENCY ANEMIA TYPE: Primary | ICD-10-CM

## 2024-05-02 DIAGNOSIS — E03.9 HYPOTHYROIDISM, UNSPECIFIED TYPE: ICD-10-CM

## 2024-05-02 DIAGNOSIS — E55.9 VITAMIN D DEFICIENCY: ICD-10-CM

## 2024-05-02 DIAGNOSIS — Z00.00 ROUTINE GENERAL MEDICAL EXAMINATION AT HEALTH CARE FACILITY: ICD-10-CM

## 2024-05-02 DIAGNOSIS — E53.8 VITAMIN B12 DEFICIENCY: ICD-10-CM

## 2024-05-02 DIAGNOSIS — I10 ESSENTIAL (PRIMARY) HYPERTENSION: ICD-10-CM

## 2024-05-02 DIAGNOSIS — K59.00 CONSTIPATION, UNSPECIFIED CONSTIPATION TYPE: ICD-10-CM

## 2024-05-02 PROCEDURE — 80061 LIPID PANEL: CPT | Performed by: INTERNAL MEDICINE

## 2024-05-02 PROCEDURE — 84443 ASSAY THYROID STIM HORMONE: CPT | Performed by: INTERNAL MEDICINE

## 2024-05-02 PROCEDURE — 99213 OFFICE O/P EST LOW 20 MIN: CPT | Performed by: INTERNAL MEDICINE

## 2024-05-02 PROCEDURE — 3078F DIAST BP <80 MM HG: CPT | Performed by: INTERNAL MEDICINE

## 2024-05-02 PROCEDURE — 1160F RVW MEDS BY RX/DR IN RCRD: CPT | Performed by: INTERNAL MEDICINE

## 2024-05-02 PROCEDURE — 1157F ADVNC CARE PLAN IN RCRD: CPT | Performed by: INTERNAL MEDICINE

## 2024-05-02 PROCEDURE — G0009 ADMIN PNEUMOCOCCAL VACCINE: HCPCS | Performed by: INTERNAL MEDICINE

## 2024-05-02 PROCEDURE — 90677 PCV20 VACCINE IM: CPT | Performed by: INTERNAL MEDICINE

## 2024-05-02 PROCEDURE — G0439 PPPS, SUBSEQ VISIT: HCPCS | Performed by: INTERNAL MEDICINE

## 2024-05-02 PROCEDURE — 82306 VITAMIN D 25 HYDROXY: CPT | Performed by: INTERNAL MEDICINE

## 2024-05-02 PROCEDURE — 1159F MED LIST DOCD IN RCRD: CPT | Performed by: INTERNAL MEDICINE

## 2024-05-02 PROCEDURE — 84450 TRANSFERASE (AST) (SGOT): CPT | Performed by: INTERNAL MEDICINE

## 2024-05-02 PROCEDURE — 1170F FXNL STATUS ASSESSED: CPT | Performed by: INTERNAL MEDICINE

## 2024-05-02 PROCEDURE — 80048 BASIC METABOLIC PNL TOTAL CA: CPT | Performed by: INTERNAL MEDICINE

## 2024-05-02 PROCEDURE — 82607 VITAMIN B-12: CPT | Performed by: INTERNAL MEDICINE

## 2024-05-02 PROCEDURE — 84460 ALANINE AMINO (ALT) (SGPT): CPT | Performed by: INTERNAL MEDICINE

## 2024-05-02 PROCEDURE — 1125F AMNT PAIN NOTED PAIN PRSNT: CPT | Performed by: INTERNAL MEDICINE

## 2024-05-02 PROCEDURE — 3074F SYST BP LT 130 MM HG: CPT | Performed by: INTERNAL MEDICINE

## 2024-05-02 PROCEDURE — 83036 HEMOGLOBIN GLYCOSYLATED A1C: CPT | Performed by: INTERNAL MEDICINE

## 2024-05-02 PROCEDURE — 85025 COMPLETE CBC W/AUTO DIFF WBC: CPT | Performed by: INTERNAL MEDICINE

## 2024-05-02 RX ORDER — AMLODIPINE BESYLATE 2.5 MG/1
2.5 TABLET ORAL DAILY
Qty: 90 TABLET | Refills: 3 | Status: SHIPPED | OUTPATIENT
Start: 2024-05-02 | End: 2024-07-31

## 2024-05-02 RX ORDER — POLYETHYLENE GLYCOL 3350 17 G/17G
17 POWDER, FOR SOLUTION ORAL DAILY
Qty: 30 PACKET | Refills: 3 | Status: SHIPPED | OUTPATIENT
Start: 2024-05-02 | End: 2024-08-30

## 2024-05-02 ASSESSMENT — ACTIVITIES OF DAILY LIVING (ADL)
TOILETING: INDEPENDENT
TOILETING: INDEPENDENT
PREPARING MEALS: NEEDS ASSISTANCE
GROCERY SHOPPING: NEEDS ASSISTANCE
MANAGING FINANCES: INDEPENDENT
FEEDING: INDEPENDENT
USING TRANSPORTATION: TOTAL CARE
WALKS IN HOME: INDEPENDENT
BATHING: NEEDS ASSISTANCE
ADEQUATE_TO_COMPLETE_ADL: YES
JUDGMENT_ADEQUATE_SAFELY_COMPLETE_DAILY_ACTIVITIES: YES
NEEDS ASSISTANCE WITH FOOD: INDEPENDENT
EATING: INDEPENDENT
DRESSING: INDEPENDENT
STIL DRIVING: NO
DRESSING YOURSELF: INDEPENDENT
BATHING: NEEDS ASSISTANCE
PATIENT'S MEMORY ADEQUATE TO SAFELY COMPLETE DAILY ACTIVITIES?: YES
ADEQUATE_TO_COMPLETE_ADL: YES
PILL BOX USED: YES
JUDGMENT_ADEQUATE_SAFELY_COMPLETE_DAILY_ACTIVITIES: YES
FEEDING YOURSELF: INDEPENDENT
HEARING - LEFT EAR: HEARING AID
USING TELEPHONE: NEEDS ASSISTANCE
GROOMING: INDEPENDENT
HEARING - RIGHT EAR: HEARING AID
DOING HOUSEWORK: NEEDS ASSISTANCE
TAKING MEDICATION: INDEPENDENT

## 2024-05-02 ASSESSMENT — PATIENT HEALTH QUESTIONNAIRE - PHQ9
SUM OF ALL RESPONSES TO PHQ9 QUESTIONS 1 AND 2: 2
2. FEELING DOWN, DEPRESSED OR HOPELESS: SEVERAL DAYS
1. LITTLE INTEREST OR PLEASURE IN DOING THINGS: SEVERAL DAYS
10. IF YOU CHECKED OFF ANY PROBLEMS, HOW DIFFICULT HAVE THESE PROBLEMS MADE IT FOR YOU TO DO YOUR WORK, TAKE CARE OF THINGS AT HOME, OR GET ALONG WITH OTHER PEOPLE: SOMEWHAT DIFFICULT

## 2024-05-02 ASSESSMENT — ANXIETY QUESTIONNAIRES
5. BEING SO RESTLESS THAT IT IS HARD TO SIT STILL: SEVERAL DAYS
IF YOU CHECKED OFF ANY PROBLEMS ON THIS QUESTIONNAIRE, HOW DIFFICULT HAVE THESE PROBLEMS MADE IT FOR YOU TO DO YOUR WORK, TAKE CARE OF THINGS AT HOME, OR GET ALONG WITH OTHER PEOPLE: SOMEWHAT DIFFICULT
7. FEELING AFRAID AS IF SOMETHING AWFUL MIGHT HAPPEN: SEVERAL DAYS
1. FEELING NERVOUS, ANXIOUS, OR ON EDGE: SEVERAL DAYS
6. BECOMING EASILY ANNOYED OR IRRITABLE: SEVERAL DAYS
3. WORRYING TOO MUCH ABOUT DIFFERENT THINGS: SEVERAL DAYS
2. NOT BEING ABLE TO STOP OR CONTROL WORRYING: SEVERAL DAYS
GAD7 TOTAL SCORE: 7
4. TROUBLE RELAXING: SEVERAL DAYS

## 2024-05-02 ASSESSMENT — ENCOUNTER SYMPTOMS
DEPRESSION: 1
OCCASIONAL FEELINGS OF UNSTEADINESS: 1
LOSS OF SENSATION IN FEET: 1

## 2024-05-02 ASSESSMENT — GERIATRIC MINI NUTRITIONAL ASSESSMENT (MNA)
B WEIGHT LOSS DURING THE LAST 3 MONTHS: NO WEIGHT LOSS
A HAS FOOD INTAKE DECLINED OVER THE PAST 3 MONTHS DUE TO LOSS OF APPETITE, DIGESTIVE PROBLEMS, CHEWING OR SWALLOWING DIFFICULTIES?: NO DECREASE IN FOOD INTAKE

## 2024-05-02 ASSESSMENT — COLUMBIA-SUICIDE SEVERITY RATING SCALE - C-SSRS
2. HAVE YOU ACTUALLY HAD ANY THOUGHTS OF KILLING YOURSELF?: NO
6. HAVE YOU EVER DONE ANYTHING, STARTED TO DO ANYTHING, OR PREPARED TO DO ANYTHING TO END YOUR LIFE?: NO
1. IN THE PAST MONTH, HAVE YOU WISHED YOU WERE DEAD OR WISHED YOU COULD GO TO SLEEP AND NOT WAKE UP?: NO

## 2024-05-02 ASSESSMENT — PAIN SCALES - GENERAL: PAINLEVEL: 10-WORST PAIN EVER

## 2024-05-02 NOTE — PROGRESS NOTES
Subjective    Sylvia Martinez is a 88 y.o. female who presents for Medicare Wellness Annual exam.  Has constipations.   Weak.    HPI    This is an 88 years old [place] speaking lady with medical history significant for abdominal pain, gastroesophageal reflux disease, chronic bloating, discomfort, constipation, history of fundoplasty, varicosities, severe low back pain, lumbosacral stenosis, arthralgia, hypothyroidism, diabetes, hyper lipidemia, vitamin D. deficiency, depression, anxiety, headaches.,s/p EGD, colonoscopy 8/13/2018 with small cecal polyp, diverticulosis, gastropathy, s/p Hospitalization 5/14/2021 for arrhythmia, s/p admission to [place] 4/7/2022 to 4/8/2022 with atrial fibrillation, s/p L cataract surgery 6/7/2022, COVID -19 8/26/2022, s/p ED visit 10/14/2023 for colitis, had Rectal and sigmoid wall thickening , concerning for proctitis, colitis.     Patient is  presented  for  Medicare Wellness Annual exam.  Has bloating, abdominal discomfort.  Has malaise, fatigue.  Taking Miralax, could not tolerate Linzess.      Followed by  GI  Dr Pickard.       by Dr Meyer, due to Bx.  Has had PET scan done.     Stated, that has been taking medications as directed.  No issues.     Denies to have any chest pain, shortness of breath, no fever or chills.  Has no headaches.     Followed by Dr. Galo.     Stated, that followed by the urology.  Blood glucose is stable, controlled by diet.  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  Advised to follow-up in 3 months.  Has lymphadenopathy, stable.   Blood pressure is stable, better controlled.  Has occasional constipations.  Weak at the time.    Review of Systems   Constitutional:  Positive for activity change, appetite change and fatigue.   Gastrointestinal:  Positive for abdominal distention, abdominal pain and constipation.   Musculoskeletal:  Positive for arthralgias, back pain and gait problem.   Neurological:  Positive for weakness.    Psychiatric/Behavioral:  Positive for sleep disturbance. The patient is nervous/anxious.        Objective        Vitals:    05/02/24 0932   BP: 118/76   Pulse: 62   Resp: 16   Temp: 36.4 °C (97.5 °F)        Physical Exam  HENT:      Head: Normocephalic.      Nose: Nose normal.   Eyes:      Pupils: Pupils are equal, round, and reactive to light.   Cardiovascular:      Rate and Rhythm: Normal rate and regular rhythm.   Abdominal:      Palpations: Abdomen is soft.   Skin:     General: Skin is warm.   Neurological:      Mental Status: She is alert. Mental status is at baseline.   Psychiatric:         Mood and Affect: Mood normal.       Diagnoses and all orders for this visit:  Iron deficiency anemia, unspecified iron deficiency anemia type (Primary)  -     CBC  Type 2 diabetes mellitus with other specified complication, without long-term current use of insulin (Multi)  -     Hemoglobin A1C  Vitamin D deficiency  -     Vitamin D 25-Hydroxy,Total (for eval of Vitamin D levels)  Vitamin B12 deficiency  -     Vitamin B12  Hypothyroidism, unspecified type  -     Thyroid Stimulating Hormone  Hyperlipidemia, mixed  -     Alanine Aminotransferase  -     Aspartate Aminotransferase  -     Basic Metabolic Panel  -     Lipid Panel  Paroxysmal atrial fibrillation (Multi)  -     apixaban (Eliquis) 5 mg tablet; Take 1 tablet (5 mg) by mouth every 12 hours.  Essential (primary) hypertension  -     amLODIPine (Norvasc) 2.5 mg tablet; Take 1 tablet (2.5 mg) by mouth once daily.  Constipation, unspecified constipation type  -     polyethylene glycol (Glycolax, Miralax) 17 gram packet; Take 17 g by mouth once daily. Mix 1 cap (17g) into 8 ounces of fluid.  Routine general medical examination at health care facility  -     1 Year Follow Up In Primary Care - Wellness Exam; Future  Other orders  -     Pneumococcal conjugate vaccine, 20-valent (PREVNAR 20)           -Atrial fibrillation episodes.  Palpitations.  Followed by Dr Galo,  EP  cardiology 3/22/44629.  Return in 6 month.  Take Flecainide  as directed.     CLL/ Grade 1 follicular lymphoma.  -Nasopharyngeal lesion/ Neck mass, Mostly, due to Follicular Lymphoma.  S/p Bx by Dr Booker 7/22/2019.  Follow by  Dr Meyer,  has had PET scan done .     - Hypertension.  Well-controlled.  Current therapy.  Taking Amlodipine 2.5 mg daily.  Exercise, avoid salt.     S/p ED visit 10/14/2023 for proctitis, colitis .  Evaluated by  ED of [facility], CT scan done.  Started on Flagyl and Keflex, was not able to tolerate Flagyl due to nausea.  Has incoming GI appointment with GI DR Pickard.     as directed.      S/p Right eye surgery cataract 1/17/2023.   Good results.     - H of  COVID -19 illness 8/26/2022.  Has still cough, some shortness of breath.  Extremely weak.   Could not tolerate medications.  Chest X ray.     -Overactive Bladder.  Stable.  Follow up with urology.     -Bacterial overgrowth syndrome.  Abdominal discomfort.  Bloating.   IBS.  Continue to use  Dicyclomine.  Strict diet.  Follow-up with GI.     -Diabetes type II.  Accu-Cheks, take medications as directed.  Diet, exercise.  Off medications.     - Anxiety, depression.  Follow-up with psychiatry.  Taking escitalopram.     - Asthma.  COPD.  Pro-air as directed.  Use Trelegy, samples are given.     - Health maintenance.  Medicare Wellness Annual Exam is today.  Colonoscopy screening is not indicated.  Declined to see GYN.     Hearing loss.  Has bilateral hearing aids.     - Overweight with BMI 29. 07.  Diet, exercise.  Keep ideal BMI is less than 25.     Brigida Valdez MD

## 2024-05-04 ASSESSMENT — ENCOUNTER SYMPTOMS
WEAKNESS: 1
SLEEP DISTURBANCE: 1
NERVOUS/ANXIOUS: 1
FATIGUE: 1
BACK PAIN: 1
APPETITE CHANGE: 1
ARTHRALGIAS: 1
ABDOMINAL DISTENTION: 1
ACTIVITY CHANGE: 1
ABDOMINAL PAIN: 1
CONSTIPATION: 1

## 2024-05-21 ENCOUNTER — OFFICE VISIT (OUTPATIENT)
Dept: HEMATOLOGY/ONCOLOGY | Facility: CLINIC | Age: 89
End: 2024-05-21
Payer: MEDICARE

## 2024-05-21 VITALS
OXYGEN SATURATION: 97 % | WEIGHT: 131 LBS | TEMPERATURE: 96.8 F | DIASTOLIC BLOOD PRESSURE: 76 MMHG | BODY MASS INDEX: 28.85 KG/M2 | RESPIRATION RATE: 18 BRPM | HEART RATE: 62 BPM | SYSTOLIC BLOOD PRESSURE: 134 MMHG

## 2024-05-21 DIAGNOSIS — C82.01: ICD-10-CM

## 2024-05-21 PROCEDURE — 1126F AMNT PAIN NOTED NONE PRSNT: CPT | Performed by: INTERNAL MEDICINE

## 2024-05-21 PROCEDURE — 3075F SYST BP GE 130 - 139MM HG: CPT | Performed by: INTERNAL MEDICINE

## 2024-05-21 PROCEDURE — 1160F RVW MEDS BY RX/DR IN RCRD: CPT | Performed by: INTERNAL MEDICINE

## 2024-05-21 PROCEDURE — 99214 OFFICE O/P EST MOD 30 MIN: CPT | Performed by: INTERNAL MEDICINE

## 2024-05-21 PROCEDURE — 1157F ADVNC CARE PLAN IN RCRD: CPT | Performed by: INTERNAL MEDICINE

## 2024-05-21 PROCEDURE — 3078F DIAST BP <80 MM HG: CPT | Performed by: INTERNAL MEDICINE

## 2024-05-21 PROCEDURE — 1159F MED LIST DOCD IN RCRD: CPT | Performed by: INTERNAL MEDICINE

## 2024-05-21 ASSESSMENT — PAIN SCALES - GENERAL: PAINLEVEL: 0-NO PAIN

## 2024-05-21 ASSESSMENT — ENCOUNTER SYMPTOMS
OCCASIONAL FEELINGS OF UNSTEADINESS: 0
LOSS OF SENSATION IN FEET: 0
DEPRESSION: 1

## 2024-05-21 NOTE — PROGRESS NOTES
Patient ID: Sylvia Martinez is a 88 y.o. female.    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      Patient had a followup CT scan of neck, chest, abdomen and pelvis  on 11/1/21 which shows that the majoriy of laila disease is stable to smaller. The largest node measures 3.2 cm in the short axis as described above. slowly increasing nodes on imaging  6/2022 8/22/23  Stable weight from last visit.  Multiple 2-3 cm cervical notes and 5x5 cm left inguinal nodes increased adenpathy by exam but assymptomatic,   Have ordered PET scan and plan laila biopsy as patient has dual dx of CLL and FCC to determine most appropratie treatment.     PET scan showed diffuse lymphadenopathy with SUV 4.5 to 4.8  with left pelvic side wall mass SUV of 4.5    Restaging evaluation November 2023    CT scan in ER 10/14/23   1.  Rectal and sigmoid colon wall thickening concerning for   proctitis/colitis.   2. There are a few enlarged lymph nodes noted in the left para-aortic   region, left common iliac chain and left external iliac chain.   3.  Nonspecific splenic hypodense lesion.   Additional database sent 11/28/23 on peripheral blood:  Analysis of peripheral  blood 11/28/28: Immunophenotypic findings consistent with CD5 positive B cell lymphoproliferative disorder, A CD5-negative B cell clone was not observed in this analysis. The phenotype is atypical for chronic lymphocytic leukemia. The differential diagnosis by phenotype includes mantle cell lymphoma and a CD5+ lymphoplasmacytic or marginal zone lymphoma. Previous tissue biopsy studies did not identify expression of Cyclin D1 or SOX11 suggesting mantle cell lymphoma was unlikely.    Focused Lymphoma NGS Results    DISEASE ASSOCIATED GENOMIC FINDINGS:   BIRC3 p.P146Swm*3 ([id number] c.[id number])  BIRC3 p.I022Fsw*34 ([id number] c.[id number])  NFKBIE p.W476Qva*13 ([id number] c.[id number])        Follicular lymphoma (Multi)   4/8/2023 Initial Diagnosis    Follicular lymphoma (CMS/HCC)         Response:     Past Medical History:     Past Medical History:   Diagnosis Date    Abnormal finding of blood chemistry, unspecified     Abnormal blood chemistry    Bladder disorder, unspecified 08/16/2016    Bladder disorder    Dyspnea, unspecified 10/11/2016    Dyspnea    Other cervical disc degeneration, unspecified cervical region 10/11/2016    Degenerative cervical disc    Other specified anxiety disorders 05/01/2019    Depression with anxiety    Other specified diseases of intestine 06/25/2017    Bacterial overgrowth syndrome    Pain in unspecified joint 07/12/2016    Joint pain    Pain in unspecified joint     Arthralgia    Palpitations 06/01/2016    Palpitations    Personal history of diseases of the blood and blood-forming organs and certain disorders involving the immune mechanism     History of anemia    Personal history of other diseases of the circulatory system 05/02/2017    H/O atrial dilatation    Personal history of other diseases of the circulatory system 10/11/2016    History of hypertension    Personal history of other diseases of the digestive system 05/02/2017    History of acute pancreatitis     Personal history of other diseases of the digestive system 07/12/2016    History of diverticulitis of colon    Personal history of other diseases of the digestive system 10/11/2016    History of hemorrhoids    Personal history of other diseases of the digestive system     History of gastroesophageal reflux (GERD)    Personal history of other diseases of the digestive system 11/08/2016    History of acute pancreatitis    Personal history of other diseases of the digestive system 10/11/2016    History of hiatal hernia    Personal history of other diseases of the musculoskeletal system and connective tissue     History of low back pain    Personal history of other diseases of the musculoskeletal system and connective tissue     History of osteoarthritis    Personal history of other diseases of the musculoskeletal system and connective tissue     History of osteopenia    Personal history of other diseases of the respiratory system 05/02/2017    History of chronic obstructive lung disease    Personal history of other diseases of the respiratory system 07/12/2016    H/O acute bronchitis    Personal history of other endocrine, nutritional and metabolic disease 05/02/2017    History of diabetes mellitus    Personal history of other endocrine, nutritional and metabolic disease 10/11/2016    History of hypothyroidism    Personal history of other endocrine, nutritional and metabolic disease     History of thyroid disease    Personal history of other endocrine, nutritional and metabolic disease 07/12/2016    History of hyperlipidemia    Personal history of other endocrine, nutritional and metabolic disease 10/11/2016    History of hyperlipidemia    Personal history of other mental and behavioral disorders 07/12/2016    History of depression    Personal history of other mental and behavioral disorders 10/11/2016    History of depression    Personal history of other specified conditions 10/11/2016    History of headache     Personal history of other specified conditions     Personal history of multiple pulmonary nodules    Personal history of other specified conditions 10/11/2016    History of shortness of breath    Personal history of other specified conditions     History of vertigo    Postnasal drip 10/24/2014    Postnasal drip    Presence of external hearing-aid     Wears hearing aid    Unspecified abdominal pain 08/08/2022    Abdominal pain       Surgical History:     Past Surgical History:   Procedure Laterality Date    CHOLECYSTECTOMY  05/02/2017    Cholecystectomy Laparoscopic    COLONOSCOPY  01/30/2014    Colonoscopy (Fiberoptic)    ESOPHAGOGASTRODUODENOSCOPY  10/11/2016    Diagnostic Esophagogastroduodenoscopy    GALLBLADDER SURGERY  10/03/2013    Gallbladder Surgery    HERNIA REPAIR  10/11/2016    Hernia Repair    HYSTERECTOMY  05/02/2017    Hysterectomy    OTHER SURGICAL HISTORY  01/02/2022    Loop recorder insertion    OTHER SURGICAL HISTORY  11/08/2016    Anterior Gastropexy For Hiatal Hernia    OTHER SURGICAL HISTORY  01/30/2014    Stress Test ECG Performed        Family History:     Family History   Problem Relation Name Age of Onset    Stroke Mother      Diabetes Other FAMILY HISTORY         TYPE 2    Hypertension Other FAMILY HISTORY         ESSENTIAL, BENIGN    Coronary artery disease Other FAMILY HISTORY        Social History:     Social History     Tobacco Use    Smoking status: Never     Passive exposure: Never    Smokeless tobacco: Never   Vaping Use    Vaping status: Never Used   Substance Use Topics    Alcohol use: Never    Drug use: Never      -------------------------------------------------------------------------------------------------------  Subjective       HPI  87 yo woman here today  with her caregiver Brigida for followup of low grade  b cell lymphoma,  recall patient declined laila biopsy in the fall ordered for progressive pelvic adenopathy and ongoing weight loss.  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 Patient was seen in the ER for colitis in October 2023  treated with cipro and flagyl and a CT of abdomen and pelvis done at that time shows a few enlarged nodes in left paraaortic, iliac and external iliac chain 1-2 cm in size.      CT scan in ER 10/14/23     1.  Rectal and sigmoid colon wall thickening concerning for   proctitis/colitis.   2. There are a few enlarged lymph nodes noted in the left para-aortic   region, left common iliac chain and left external iliac chain.   3.  Nonspecific splenic hypodense lesion.   Additional database sent 11/28/23 on peripheral blood:     Peripheral blood flow 11/2023: Immunophenotypic findings consistent with CD5 positive B cell lymphoproliferative disorder, see note:   Note: A CD5-negative B cell clone was not observed in this analysis. The phenotype is atypical for chronic lymphocytic leukemia. The differential diagnosis by phenotype includes mantle cell lymphoma and a CD5+ lymphoplasmacytic or marginal zone lymphoma. Previous tissue biopsy studies did not identify expression of Cyclin D1 or SOX11 suggesting mantle cell lymphoma was unlikely.    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 She is overall feeling well and has no bothersome lymphadenopathy, weight is stable. No fevers, or sweats    Review of Systems   All other systems reviewed and are negative.     -------------------------------------------------------------------------------------------------------  Objective   BSA: 1.54 meters squared  /76   Pulse 62    Temp 36 °C (96.8 °F) (Core)   Resp 18   Wt 59.4 kg (131 lb)   SpO2 97%   BMI 28.85 kg/m²     Physical Exam  Constitutional:       Appearance: Normal appearance.      Comments: Petite, alert and conversant, [place] Islander speaking   HENT:      Head: Normocephalic and atraumatic.      Nose: Nose normal.   Eyes:      Extraocular Movements: Extraocular movements intact.      Conjunctiva/sclera: Conjunctivae normal.      Pupils: Pupils are equal, round, and reactive to light.   Cardiovascular:      Rate and Rhythm: Normal rate and regular rhythm.      Heart sounds: Murmur (2/6 RUMA) heard.   Pulmonary:      Breath sounds: Normal breath sounds.   Abdominal:      General: Abdomen is flat. Bowel sounds are normal.      Palpations: Abdomen is soft.   Musculoskeletal:         General: Normal range of motion.      Right lower le+ Pitting Edema present.      Left lower le+ Pitting Edema present.   Lymphadenopathy:      Cervical: Cervical adenopathy present.      Comments: Bilateral cervical nodes anterior and posterior about 1.5 cm in size, no splenomegally,    Fullness left inguinal nahum about 5x4 cm       Skin:     General: Skin is warm and dry.   Neurological:      General: No focal deficit present.      Mental Status: She is oriented to person, place, and time.   Psychiatric:         Mood and Affect: Mood normal.         Behavior: Behavior normal.         Thought Content: Thought content normal.         Performance Status:  Symptomatic; fully ambulatory      -------------------------------------------------------------------------------------------------------  Assessment/Plan      89 yo Patient with  reported dual  diagnosis of CLL and FCC , however recent flow actually suggests marginal zone ( see above) which is more consistent with her clinical course of indolent disease and circulating lymphocytosis.  CT imaging and clinically stable and weight is better so will continue watchful waiting.  If treatment needed  would favor single agent rituxan, although a BTK inhibitor would also be appropriate.     Overall patient is doing fine, continue observation.  Will repeat CT imaging based on clinical findings    RTC: 6  months with blood work         -------------------------------------------------------------------------------------------------------  Liyah Meyer MD

## 2024-05-26 PROCEDURE — G0179 MD RECERTIFICATION HHA PT: HCPCS | Performed by: INTERNAL MEDICINE

## 2024-07-25 PROCEDURE — G0179 MD RECERTIFICATION HHA PT: HCPCS | Performed by: INTERNAL MEDICINE

## 2024-08-01 ENCOUNTER — APPOINTMENT (OUTPATIENT)
Dept: PRIMARY CARE | Facility: CLINIC | Age: 89
End: 2024-08-01
Payer: MEDICARE

## 2024-08-01 VITALS
BODY MASS INDEX: 28.69 KG/M2 | RESPIRATION RATE: 16 BRPM | WEIGHT: 133 LBS | HEART RATE: 64 BPM | SYSTOLIC BLOOD PRESSURE: 122 MMHG | TEMPERATURE: 97 F | HEIGHT: 57 IN | DIASTOLIC BLOOD PRESSURE: 68 MMHG

## 2024-08-01 DIAGNOSIS — E78.2 HYPERLIPIDEMIA, MIXED: ICD-10-CM

## 2024-08-01 DIAGNOSIS — R53.83 MALAISE AND FATIGUE: ICD-10-CM

## 2024-08-01 DIAGNOSIS — E55.9 VITAMIN D DEFICIENCY: ICD-10-CM

## 2024-08-01 DIAGNOSIS — R53.81 MALAISE AND FATIGUE: ICD-10-CM

## 2024-08-01 DIAGNOSIS — I10 ESSENTIAL (PRIMARY) HYPERTENSION: ICD-10-CM

## 2024-08-01 DIAGNOSIS — Z78.0 ASYMPTOMATIC MENOPAUSAL STATE: ICD-10-CM

## 2024-08-01 DIAGNOSIS — E03.9 HYPOTHYROIDISM, UNSPECIFIED TYPE: ICD-10-CM

## 2024-08-01 DIAGNOSIS — E53.8 VITAMIN B12 DEFICIENCY: ICD-10-CM

## 2024-08-01 DIAGNOSIS — E11.69 TYPE 2 DIABETES MELLITUS WITH OTHER SPECIFIED COMPLICATION, WITHOUT LONG-TERM CURRENT USE OF INSULIN (MULTI): Primary | ICD-10-CM

## 2024-08-01 DIAGNOSIS — L98.9 SKIN LESION: ICD-10-CM

## 2024-08-01 DIAGNOSIS — I48.0 PAROXYSMAL ATRIAL FIBRILLATION (MULTI): ICD-10-CM

## 2024-08-01 LAB
APPEARANCE UR: CLEAR
BILIRUB UR QL STRIP: NEGATIVE
COLOR UR: YELLOW
GLUCOSE UR STRIP-MCNC: NEGATIVE MG/DL
HGB UR QL STRIP: NEGATIVE
KETONES UR STRIP-MCNC: NEGATIVE MG/DL
LEUKOCYTE ESTERASE UR QL STRIP: NEGATIVE
NITRITE UR QL STRIP: NEGATIVE
PH UR STRIP: 6.5 [PH]
PROT UR STRIP-MCNC: NEGATIVE MG/DL
SP GR UR STRIP.AUTO: 1.01
UROBILINOGEN UR STRIP-ACNC: 0.2 E.U./DL

## 2024-08-01 PROCEDURE — 1159F MED LIST DOCD IN RCRD: CPT | Performed by: INTERNAL MEDICINE

## 2024-08-01 PROCEDURE — 81003 URINALYSIS AUTO W/O SCOPE: CPT | Performed by: INTERNAL MEDICINE

## 2024-08-01 PROCEDURE — 3078F DIAST BP <80 MM HG: CPT | Performed by: INTERNAL MEDICINE

## 2024-08-01 PROCEDURE — 82043 UR ALBUMIN QUANTITATIVE: CPT | Performed by: INTERNAL MEDICINE

## 2024-08-01 PROCEDURE — 1157F ADVNC CARE PLAN IN RCRD: CPT | Performed by: INTERNAL MEDICINE

## 2024-08-01 PROCEDURE — 3074F SYST BP LT 130 MM HG: CPT | Performed by: INTERNAL MEDICINE

## 2024-08-01 PROCEDURE — 1160F RVW MEDS BY RX/DR IN RCRD: CPT | Performed by: INTERNAL MEDICINE

## 2024-08-01 PROCEDURE — 1036F TOBACCO NON-USER: CPT | Performed by: INTERNAL MEDICINE

## 2024-08-01 PROCEDURE — 99213 OFFICE O/P EST LOW 20 MIN: CPT | Performed by: INTERNAL MEDICINE

## 2024-08-01 RX ORDER — AMLODIPINE BESYLATE 2.5 MG/1
2.5 TABLET ORAL DAILY
Qty: 90 TABLET | Refills: 3 | Status: SHIPPED | OUTPATIENT
Start: 2024-08-01 | End: 2024-10-30

## 2024-08-01 ASSESSMENT — ENCOUNTER SYMPTOMS
ABDOMINAL DISTENTION: 1
ACTIVITY CHANGE: 1
SLEEP DISTURBANCE: 1
BACK PAIN: 1
ARTHRALGIAS: 1
ABDOMINAL PAIN: 1
FATIGUE: 1

## 2024-08-01 NOTE — PROGRESS NOTES
Subjective   Patient ID: Sylvia Martinez is a 88 y.o. female who presents for follow up.  Has L hip pain.  Complaining of bloating, cough , wheezing at night time.  Insomnia.  Has nasal congestion.    HPI   This is an 88 years old [place] speaking lady with medical history significant for abdominal pain, gastroesophageal reflux disease, chronic bloating, discomfort, constipation, history of fundoplasty, varicosities, severe low back pain, lumbosacral stenosis, arthralgia, hypothyroidism, diabetes, hyper lipidemia, vitamin D. deficiency, depression, anxiety, headaches.,s/p EGD, colonoscopy 8/13/2018 with small cecal polyp, diverticulosis, gastropathy, s/p Hospitalization 5/14/2021 for arrhythmia, s/p admission to [facility] 4/7/2022 to 4/8/2022 with atrial fibrillation, s/p L cataract surgery 6/7/2022, COVID -19 8/26/2022, s/p ED visit 10/14/2023 for colitis, had Rectal and sigmoid wall thickening , concerning for proctitis, colitis.     Patient is  presented  for follow up.  Followed by oncology Dr. Meyer for CLL, follicular lymphoma, seen 5/2024.  Advised to follow-up in 6 months, 11/12/2024.  Has lymphadenopathy, stable.     Blood pressure is stable, better controlled.  Has occasional constipations.  Weak at the time.  Stable.  Has bloating, abdominal discomfort.  Has malaise, fatigue.  Taking Miralax, could not tolerate Linzess.      Followed by  GI  Dr Pickard.     Denies to have any chest pain, shortness of breath, no fever or chills.  Has no headaches.     Followed by Dr. Galo.     Stated, that followed by the urology.  Blood glucose is stable, controlled by diet.       Review of Systems   Constitutional:  Positive for activity change and fatigue.   Gastrointestinal:  Positive for abdominal distention and abdominal pain.   Musculoskeletal:  Positive for arthralgias, back pain and gait problem.   Psychiatric/Behavioral:  Positive for sleep disturbance.        Objective   /68   Pulse 64   Temp 36.1 °C  "(97 °F) (Temporal)   Resp 16   Ht 1.435 m (4' 8.5\")   Wt 60.3 kg (133 lb)   BMI 29.29 kg/m²     Physical Exam  HENT:      Head: Normocephalic.      Nose: Nose normal.   Eyes:      Pupils: Pupils are equal, round, and reactive to light.   Cardiovascular:      Rate and Rhythm: Normal rate and regular rhythm.      Heart sounds: Normal heart sounds.   Pulmonary:      Breath sounds: Normal breath sounds.   Abdominal:      Palpations: Abdomen is soft.   Musculoskeletal:         General: Tenderness present. Normal range of motion.   Skin:     General: Skin is warm.   Neurological:      Mental Status: She is alert. Mental status is at baseline.   Psychiatric:         Mood and Affect: Mood normal.       Diabetic foot  exam has mild edema, good pulses.    Assessment/Plan   Problem List Items Addressed This Visit             ICD-10-CM    Diabetes mellitus (Multi) - Primary E11.9    Relevant Orders    POCT ALBUMIN RANDOM URINE DOCKED DEVICE    Hemoglobin A1C    Paroxysmal atrial fibrillation (Multi) I48.0    Relevant Medications    apixaban (Eliquis) 5 mg tablet    amLODIPine (Norvasc) 2.5 mg tablet    Hypothyroidism E03.9    Relevant Orders    Thyroid Stimulating Hormone    Hyperlipidemia, mixed E78.2    Relevant Orders    Comprehensive Metabolic Panel    Lipid Panel    Vitamin D deficiency E55.9    Relevant Orders    Vitamin D 25-Hydroxy,Total (for eval of Vitamin D levels)    Vitamin B12 deficiency E53.8    Relevant Orders    Vitamin B12     Other Visit Diagnoses         Codes    Malaise and fatigue     R53.81, R53.83    Relevant Orders    CBC    Skin lesion     L98.9    Relevant Orders    Referral to Dermatology    Asymptomatic menopausal state     Z78.0    Relevant Orders    XR DEXA bone density    Essential (primary) hypertension     I10    Relevant Medications    amLODIPine (Norvasc) 2.5 mg tablet           -Atrial fibrillation episodes.  Palpitations.  Followed by Dr Galo,  EP cardiology 3/22/78483.  Return in 6 " month.  Take Flecainide  as directed.     CLL/ Grade 1 follicular lymphoma.  -Nasopharyngeal lesion/ Neck mass, Mostly, due to Follicular Lymphoma.  S/p Bx by Dr Booker 7/22/2019.  Followed  by  Dr Meyer,  next appointment 11/2024.     - Hypertension.  Well-controlled.  Current therapy.  Taking Amlodipine 2.5 mg daily.  Exercise, avoid salt.     S/p ED visit 10/14/2023 for proctitis, colitis .  Evaluated by  ED of [facility], CT scan done.  Started on Flagyl and Keflex, was not able to tolerate Flagyl due to nausea.  Has incoming GI appointment with GI DR Pickard.     as directed.      S/p Right eye surgery cataract 1/17/2023.   Good results.     - H of  COVID -19 illness 8/26/2022.  Has still cough, some shortness of breath.  Extremely weak.   Could not tolerate medications.  Chest X ray.     -Overactive Bladder.  Stable.  Follow up with urology.     -Bacterial overgrowth syndrome.  Abdominal discomfort.  Bloating.   IBS.  Continue to use  Dicyclomine.  Strict diet.  Follow-up with GI.     -Diabetes type II.  Accu-Cheks, take medications as directed.  Diet, exercise.  Off medications.     - Anxiety, depression.  Follow-up with psychiatry.  Taking escitalopram.     - Asthma.  COPD.  Pro-air as directed.  Use Trelegy, samples are given.     - Health maintenance.  Medicare Wellness Annual Exam is  up to date.  Colonoscopy screening is not indicated.  Declined to see GYN.     Hearing loss.  Has bilateral hearing aids.     - Overweight with BMI 28.85  Diet, exercise.  Keep ideal BMI is less than 25.

## 2024-08-02 ENCOUNTER — APPOINTMENT (OUTPATIENT)
Dept: PRIMARY CARE | Facility: CLINIC | Age: 89
End: 2024-08-02
Payer: MEDICARE

## 2024-08-02 LAB — HEMOGLOBIN A1C/HEMOGLOBIN TOTAL IN BLOOD EXTERNAL: 5.8 %

## 2024-08-22 DIAGNOSIS — E55.9 VITAMIN D DEFICIENCY: ICD-10-CM

## 2024-08-22 RX ORDER — NICOTINE 11MG/24HR
PATCH, TRANSDERMAL 24 HOURS TRANSDERMAL DAILY
Qty: 30 CAPSULE | Refills: 3 | Status: SHIPPED | OUTPATIENT
Start: 2024-08-22

## 2024-09-23 PROCEDURE — G0179 MD RECERTIFICATION HHA PT: HCPCS | Performed by: INTERNAL MEDICINE

## 2024-10-07 ENCOUNTER — HOSPITAL ENCOUNTER (OUTPATIENT)
Dept: RADIOLOGY | Facility: CLINIC | Age: 89
Discharge: HOME | End: 2024-10-07
Payer: MEDICARE

## 2024-10-07 DIAGNOSIS — Z78.0 ASYMPTOMATIC MENOPAUSAL STATE: ICD-10-CM

## 2024-10-07 PROCEDURE — 77080 DXA BONE DENSITY AXIAL: CPT

## 2024-10-07 PROCEDURE — 77080 DXA BONE DENSITY AXIAL: CPT | Performed by: RADIOLOGY

## 2024-10-08 ENCOUNTER — APPOINTMENT (OUTPATIENT)
Dept: RADIOLOGY | Facility: CLINIC | Age: 89
End: 2024-10-08
Payer: MEDICARE

## 2024-10-16 DIAGNOSIS — E78.2 HYPERLIPIDEMIA, MIXED: ICD-10-CM

## 2024-10-16 DIAGNOSIS — R10.9 ABDOMINAL PAIN, UNSPECIFIED ABDOMINAL LOCATION: ICD-10-CM

## 2024-10-16 RX ORDER — ATORVASTATIN CALCIUM 20 MG/1
20 TABLET, FILM COATED ORAL DAILY
Qty: 30 TABLET | Refills: 6 | Status: SHIPPED | OUTPATIENT
Start: 2024-10-16

## 2024-10-16 RX ORDER — PANCRELIPASE 24000; 76000; 120000 [USP'U]/1; [USP'U]/1; [USP'U]/1
CAPSULE, DELAYED RELEASE PELLETS ORAL
Qty: 100 CAPSULE | Refills: 6 | Status: SHIPPED | OUTPATIENT
Start: 2024-10-16

## 2024-10-25 DIAGNOSIS — E03.9 HYPOTHYROIDISM, UNSPECIFIED: ICD-10-CM

## 2024-10-25 RX ORDER — LEVOTHYROXINE SODIUM 88 UG/1
88 TABLET ORAL DAILY
Qty: 90 TABLET | Refills: 3 | Status: SHIPPED | OUTPATIENT
Start: 2024-10-25

## 2024-10-30 DIAGNOSIS — E03.9 HYPOTHYROIDISM, UNSPECIFIED: ICD-10-CM

## 2024-10-30 RX ORDER — LEVOTHYROXINE SODIUM 88 UG/1
88 TABLET ORAL DAILY
Qty: 90 TABLET | Refills: 0 | Status: SHIPPED | OUTPATIENT
Start: 2024-10-30 | End: 2024-11-01 | Stop reason: SDUPTHER

## 2024-11-01 RX ORDER — LEVOTHYROXINE SODIUM 88 UG/1
88 TABLET ORAL DAILY
Qty: 90 TABLET | Refills: 0 | Status: SHIPPED | OUTPATIENT
Start: 2024-11-01

## 2024-11-08 ENCOUNTER — APPOINTMENT (OUTPATIENT)
Dept: PRIMARY CARE | Facility: CLINIC | Age: 89
End: 2024-11-08
Payer: MEDICARE

## 2024-11-08 VITALS
WEIGHT: 128 LBS | HEIGHT: 57 IN | DIASTOLIC BLOOD PRESSURE: 78 MMHG | BODY MASS INDEX: 27.61 KG/M2 | HEART RATE: 64 BPM | RESPIRATION RATE: 16 BRPM | TEMPERATURE: 98.1 F | SYSTOLIC BLOOD PRESSURE: 118 MMHG

## 2024-11-08 DIAGNOSIS — R09.81 NASAL CONGESTION: Primary | ICD-10-CM

## 2024-11-08 DIAGNOSIS — T78.40XS ALLERGIC REACTION, SEQUELA: ICD-10-CM

## 2024-11-08 DIAGNOSIS — I48.91 ATRIAL FIBRILLATION, UNSPECIFIED TYPE (MULTI): ICD-10-CM

## 2024-11-08 DIAGNOSIS — K21.9 GASTROESOPHAGEAL REFLUX DISEASE, UNSPECIFIED WHETHER ESOPHAGITIS PRESENT: ICD-10-CM

## 2024-11-08 DIAGNOSIS — M54.50 LOW BACK PAIN, UNSPECIFIED BACK PAIN LATERALITY, UNSPECIFIED CHRONICITY, UNSPECIFIED WHETHER SCIATICA PRESENT: ICD-10-CM

## 2024-11-08 PROCEDURE — 3078F DIAST BP <80 MM HG: CPT | Performed by: INTERNAL MEDICINE

## 2024-11-08 PROCEDURE — 99213 OFFICE O/P EST LOW 20 MIN: CPT | Performed by: INTERNAL MEDICINE

## 2024-11-08 PROCEDURE — 1159F MED LIST DOCD IN RCRD: CPT | Performed by: INTERNAL MEDICINE

## 2024-11-08 PROCEDURE — 1157F ADVNC CARE PLAN IN RCRD: CPT | Performed by: INTERNAL MEDICINE

## 2024-11-08 PROCEDURE — 3074F SYST BP LT 130 MM HG: CPT | Performed by: INTERNAL MEDICINE

## 2024-11-08 PROCEDURE — 1160F RVW MEDS BY RX/DR IN RCRD: CPT | Performed by: INTERNAL MEDICINE

## 2024-11-08 PROCEDURE — G2211 COMPLEX E/M VISIT ADD ON: HCPCS | Performed by: INTERNAL MEDICINE

## 2024-11-08 RX ORDER — LORATADINE 10 MG/1
10 TABLET ORAL DAILY
Qty: 30 TABLET | Refills: 5 | Status: SHIPPED | OUTPATIENT
Start: 2024-11-08 | End: 2025-05-07

## 2024-11-08 ASSESSMENT — ENCOUNTER SYMPTOMS
ACTIVITY CHANGE: 1
FATIGUE: 1
NERVOUS/ANXIOUS: 0
ARTHRALGIAS: 1

## 2024-11-08 NOTE — PROGRESS NOTES
Subjective   Patient ID: Sylvia Martinez is a 88 y.o. female who presents for  follow up.  Has congestion, worse at night time.  Weak.  Has SO(B, worse at night time.  Complaining of constipation.    HPI     This is an 88 years old Norwegian speaking lady with medical history significant for abdominal pain, gastroesophageal reflux disease, chronic bloating, discomfort, constipation, history of fundoplasty, varicosities, severe low back pain, lumbosacral stenosis, arthralgia, hypothyroidism, diabetes, hyper lipidemia, vitamin D. deficiency, depression, anxiety, headaches.,s/p EGD, colonoscopy 8/13/2018 with small cecal polyp, diverticulosis, gastropathy, s/p Hospitalization 5/14/2021 for arrhythmia, s/p admission to [facility] 4/7/2022 to 4/8/2022 with atrial fibrillation, s/p L cataract surgery 6/7/2022, COVID -19 8/26/2022, s/p ED visit 10/14/2023 for colitis, had Rectal and sigmoid wall thickening , concerning for proctitis, colitis.     Patient is  presented  for follow up.  Having congestion, weak.  Has low energy, fatigue.  Followed by oncology Dr. Meyer for CLL, follicular lymphoma, seen 5/2024.  Advised to follow-up in 6 months, 11/12/2024.  Has lymphadenopathy, stable.      Blood pressure is stable, better controlled.  Has occasional constipations.  Weak at the time.  Stable.  Has bloating, abdominal discomfort.  Has malaise, fatigue.  Taking Miralax, could not tolerate Linzess.      Followed by  GI  Dr Pickard.     Denies to have any chest pain, shortness of breath, no fever or chills.  Has no headaches.     Followed by Dr. Galo.     Stated, that followed by the urology.  Blood glucose is stable, controlled by diet.     Review of Systems   Constitutional:  Positive for activity change and fatigue.   HENT:  Positive for congestion and postnasal drip.    Musculoskeletal:  Positive for arthralgias.   Psychiatric/Behavioral:  The patient is not nervous/anxious.        Objective   /78   Pulse 64   Temp  "36.7 °C (98.1 °F) (Temporal)   Resp 16   Ht 1.435 m (4' 8.5\")   Wt 58.1 kg (128 lb)   BMI 28.19 kg/m²     Physical Exam  HENT:      Nose: Nose normal.   Cardiovascular:      Rate and Rhythm: Normal rate and regular rhythm.      Heart sounds: Normal heart sounds.   Pulmonary:      Breath sounds: Normal breath sounds.   Abdominal:      Palpations: Abdomen is soft.   Skin:     General: Skin is warm.   Neurological:      Mental Status: She is alert. Mental status is at baseline.   Psychiatric:         Mood and Affect: Mood normal.         Assessment/Plan   Problem List Items Addressed This Visit             ICD-10-CM    Low back pain M54.50    Atrial fibrillation (Multi) I48.91    Gastroesophageal reflux disease K21.9     Other Visit Diagnoses         Codes    Nasal congestion    -  Primary R09.81    Relevant Orders    Referral to ENT    Allergic reaction, sequela     T78.40XS    Relevant Medications    loratadine (Claritin) 10 mg tablet          Congestion.  Stated, that having symptoms for prolong period of time.  See ENT.    -Bacterial overgrowth syndrome.  Abdominal discomfort.  Bloating.   IBS.  Continue to use  Dicyclomine.  Strict diet.  Follow-up with GI.     -Atrial fibrillation episodes.  Palpitations.  Followed by Dr Galo,  EP cardiology 3/22/42465.  Return in 6 month.  Take Flecainide  as directed.     CLL/ Grade 1 follicular lymphoma.  -Nasopharyngeal lesion/ Neck mass, Mostly, due to Follicular Lymphoma.  S/p Bx by Dr Booker 7/22/2019.  Followed  by  Dr Meyer,  next appointment 11/2024.     - Hypertension.  Well-controlled.  Current therapy.  Taking Amlodipine 2.5 mg daily.  Exercise, avoid salt.     S/p ED visit 10/14/2023 for proctitis, colitis .  Evaluated by  ED of [facility], CT scan done.  Started on Flagyl and Keflex, was not able to tolerate Flagyl due to nausea.  Has incoming GI appointment with GI DR Pickard.     as directed.      S/p Right eye surgery cataract 1/17/2023.   Good " results.     - H of  COVID -19 illness 8/26/2022.  Has still cough, some shortness of breath.  Extremely weak.   Could not tolerate medications.  Chest X ray.     -Overactive Bladder.  Stable.  Follow up with urology.     -Diabetes type II.  Accu-Cheks, take medications as directed.  Diet, exercise.  Off medications.     - Anxiety, depression.  Follow-up with psychiatry.  Taking escitalopram.     - Asthma.  COPD.  Pro-air as directed.  Use Trelegy, samples are given.     - Health maintenance.  Medicare Wellness Annual Exam is  up to date.  Colonoscopy screening is not indicated.  Declined to see GYN.     Hearing loss.  Has bilateral hearing aids.     - Overweight with BMI 28. 19  Diet, exercise.  Keep ideal BMI is less than 25.

## 2024-11-11 NOTE — PROGRESS NOTES
Patient ID: Sylvia Martinez is a 88 y.o. female.    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      Patient had a followup CT scan of neck, chest, abdomen and pelvis  on 11/1/21 which shows that the majoriy of laila disease is stable to smaller. The largest node measures 3.2 cm in the short axis as described above. slowly increasing nodes on imaging  6/2022 8/22/23  Stable weight from last visit.  Multiple 2-3 cm cervical notes and 5x5 cm left inguinal nodes increased adenpathy by exam but assymptomatic,   Have ordered PET scan and plan laila biopsy as patient has dual dx of CLL and FCC to determine most appropratie treatment.     PET scan showed diffuse lymphadenopathy with SUV 4.5 to 4.8  with left pelvic side wall mass SUV of 4.5    Restaging evaluation November 2023    CT scan in ER 10/14/23   1.  Rectal and sigmoid colon wall thickening concerning for   proctitis/colitis.   2. There are a few enlarged lymph nodes noted in the left para-aortic   region, left common iliac chain and left external iliac chain.   3.  Nonspecific splenic hypodense lesion.   Additional database sent 11/28/23 on peripheral blood:  Analysis of peripheral blood 11/28/28: Immunophenotypic findings consistent with CD5 positive B cell lymphoproliferative disorder, A CD5-negative B cell clone was not observed in this analysis. The  phenotype is atypical for chronic lymphocytic leukemia. The differential diagnosis by phenotype includes mantle cell lymphoma and a CD5+ lymphoplasmacytic or marginal zone lymphoma. Previous tissue biopsy studies did not identify expression of Cyclin D1 or SOX11 suggesting mantle cell lymphoma was unlikely.    Focused Lymphoma NGS Results    DISEASE ASSOCIATED GENOMIC FINDINGS:   BIRC3 p.V920Snb*3 ([id number] c.[id number])  BIRC3 p.B390Ipr*34 ([id number] c.[id number])  NFKBIE p.W191Vpc*13 ([id number] c.[id number])        Follicular lymphoma   4/8/2023 Initial Diagnosis    Follicular lymphoma (CMS/HCC)         Response:     Past Medical History:     Past Medical History:   Diagnosis Date    Abnormal finding of blood chemistry, unspecified     Abnormal blood chemistry    Bladder disorder, unspecified 08/16/2016    Bladder disorder    Dyspnea, unspecified 10/11/2016    Dyspnea    Other cervical disc degeneration, unspecified cervical region 10/11/2016    Degenerative cervical disc    Other specified anxiety disorders 05/01/2019    Depression with anxiety    Other specified diseases of intestine 06/25/2017    Bacterial overgrowth syndrome    Pain in unspecified joint 07/12/2016    Joint pain    Pain in unspecified joint     Arthralgia    Palpitations 06/01/2016    Palpitations    Personal history of diseases of the blood and blood-forming organs and certain disorders involving the immune mechanism     History of anemia    Personal history of other diseases of the circulatory system 05/02/2017    H/O atrial dilatation    Personal history of other diseases of the circulatory system 10/11/2016    History of hypertension    Personal history of other diseases of the digestive system 05/02/2017    History of acute pancreatitis    Personal history of other diseases of the digestive system 07/12/2016    History of diverticulitis of colon    Personal history of other diseases of the digestive system 10/11/2016     History of hemorrhoids    Personal history of other diseases of the digestive system     History of gastroesophageal reflux (GERD)    Personal history of other diseases of the digestive system 11/08/2016    History of acute pancreatitis    Personal history of other diseases of the digestive system 10/11/2016    History of hiatal hernia    Personal history of other diseases of the musculoskeletal system and connective tissue     History of low back pain    Personal history of other diseases of the musculoskeletal system and connective tissue     History of osteoarthritis    Personal history of other diseases of the musculoskeletal system and connective tissue     History of osteopenia    Personal history of other diseases of the respiratory system 05/02/2017    History of chronic obstructive lung disease    Personal history of other diseases of the respiratory system 07/12/2016    H/O acute bronchitis    Personal history of other endocrine, nutritional and metabolic disease 05/02/2017    History of diabetes mellitus    Personal history of other endocrine, nutritional and metabolic disease 10/11/2016    History of hypothyroidism    Personal history of other endocrine, nutritional and metabolic disease     History of thyroid disease    Personal history of other endocrine, nutritional and metabolic disease 07/12/2016    History of hyperlipidemia    Personal history of other endocrine, nutritional and metabolic disease 10/11/2016    History of hyperlipidemia    Personal history of other mental and behavioral disorders 07/12/2016    History of depression    Personal history of other mental and behavioral disorders 10/11/2016    History of depression    Personal history of other specified conditions 10/11/2016    History of headache    Personal history of other specified conditions     Personal history of multiple pulmonary nodules    Personal history of other specified conditions 10/11/2016    History of shortness of  breath    Personal history of other specified conditions     History of vertigo    Postnasal drip 10/24/2014    Postnasal drip    Presence of external hearing-aid     Wears hearing aid    Unspecified abdominal pain 08/08/2022    Abdominal pain       Surgical History:     Past Surgical History:   Procedure Laterality Date    CHOLECYSTECTOMY  05/02/2017    Cholecystectomy Laparoscopic    COLONOSCOPY  01/30/2014    Colonoscopy (Fiberoptic)    ESOPHAGOGASTRODUODENOSCOPY  10/11/2016    Diagnostic Esophagogastroduodenoscopy    GALLBLADDER SURGERY  10/03/2013    Gallbladder Surgery    HERNIA REPAIR  10/11/2016    Hernia Repair    HYSTERECTOMY  05/02/2017    Hysterectomy    OTHER SURGICAL HISTORY  01/02/2022    Loop recorder insertion    OTHER SURGICAL HISTORY  11/08/2016    Anterior Gastropexy For Hiatal Hernia    OTHER SURGICAL HISTORY  01/30/2014    Stress Test ECG Performed        Family History:     Family History   Problem Relation Name Age of Onset    Stroke Mother      Diabetes Other FAMILY HISTORY         TYPE 2    Hypertension Other FAMILY HISTORY         ESSENTIAL, BENIGN    Coronary artery disease Other FAMILY HISTORY        Social History:  Salvadorean speaking.  Has  (Brigida) who comes to visits with her.  Okay to call Brigida regarding care.    Social History     Tobacco Use    Smoking status: Never     Passive exposure: Never    Smokeless tobacco: Never   Vaping Use    Vaping status: Never Used   Substance Use Topics    Alcohol use: Never    Drug use: Never      -------------------------------------------------------------------------------------------------------  Subjective       HPI    Sylvia Martinez is a 89 yo woman here today  with her caregiver Brigida for followup of low grade b cell lymphoma, recall patient declined laila biopsy in the fall ordered for progressive pelvic adenopathy and ongoing weight loss.  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 Patient was seen in the ER for colitis in October 2023  treated with cipro and flagyl and a CT of abdomen and pelvis done at that time shows a few enlarged nodes in left paraaortic, iliac and external iliac chain 1-2 cm in size.      CT scan in ER 10/14/23     1.  Rectal and sigmoid colon wall thickening concerning for   proctitis/colitis.   2. There are a few enlarged lymph nodes noted in the left para-aortic   region, left common iliac chain and left external iliac chain.   3.  Nonspecific splenic hypodense lesion.   Additional database sent 11/28/23 on peripheral blood:     Peripheral blood flow 11/2023: Immunophenotypic findings consistent with CD5 positive B cell lymphoproliferative disorder, see note:   Note: A CD5-negative B cell clone was not observed in this analysis. The phenotype is atypical for chronic lymphocytic leukemia. The differential diagnosis by phenotype includes mantle cell lymphoma and a CD5+ lymphoplasmacytic or marginal zone lymphoma. Previous tissue biopsy studies did not identify expression of Cyclin D1 or SOX11 suggesting mantle cell lymphoma was unlikely.    Focused Lymphoma NGS Results    DISEASE ASSOCIATED GENOMIC FINDINGS:   BIRC3 p.R221Lqd*3 ([id number] c.[id number])  BIRC3 p.D132Idp*34 ([id number] c.[id number])  NFKBIE p.H260Lif*13 ([id number] c.[id number])     Sylvia presents to the clinic today (11/12/24) with her  Brigida for follow up evaluation and count check.  Brigida has worked with Sylvia since 2017 and assists with interpreting for the patient and assists with setting up appointments.  Sylvia is refusing use of interpretor today for visit.  Sylvia lives alone,  states he son helps her.       Reports \"stiffness in nose\", which makes it more difficult to breath and is having mucous.  The stiffness used to occur more intermittently but has gotten worse recently.    States she has a history of asthma, has been " using inhaler which is not helping.  Sylvia has an appt with PCP Dr. Valdez on 11/8/24, who recommended she start taking loratadine, and placed an ENT referral.      She reports ongoing night sweats every night, not drenching.  Energy level is lower.  Uses cane for ambulation, no recent falls.  Continues to have lower appetite.  Weight is stable.  Has bowel issues with gluten.  Reports intermittent nausea.  Constipation uses senna and miralax.  Continues to have shortness of breath and non-productive cough.  Ongoing edema to bilateral legs, worse in the evening.  Continues to have enlarged lymph to neck and groin, staying the same size and are non-tender.    Review of Systems   Constitutional:  Positive for appetite change, diaphoresis and fatigue. Negative for chills, fever and unexpected weight change.   Respiratory:  Positive for cough and shortness of breath. Negative for hemoptysis and wheezing.    Cardiovascular:  Positive for leg swelling.   Gastrointestinal:  Positive for constipation and nausea. Negative for diarrhea and vomiting.   Genitourinary: Negative.     Musculoskeletal:  Positive for gait problem. Negative for arthralgias.   Skin: Negative.    Neurological:  Positive for gait problem. Negative for numbness.   Hematological:  Positive for adenopathy. Does not bruise/bleed easily.   All other systems reviewed and are negative.  -------------------------------------------------------------------------------------------------------  Objective   BSA: 1.52 meters squared  /69   Pulse 69   Temp 36.2 °C (97.2 °F) (Core)   Resp 18   Wt 57.6 kg (127 lb 1.5 oz)   SpO2 96%   BMI 27.99 kg/m²     Physical Exam  Constitutional:       Appearance: Normal appearance.      Comments: Petite, alert and conversant, Macanese speaking   HENT:      Head: Normocephalic and atraumatic.      Nose: Nose normal.      Comments: No visible masses to bilateral nostrils.  Eyes:      Extraocular Movements: Extraocular  movements intact.      Conjunctiva/sclera: Conjunctivae normal.      Pupils: Pupils are equal, round, and reactive to light.   Cardiovascular:      Rate and Rhythm: Normal rate and regular rhythm.      Heart sounds: Murmur (2/6 RUMA) heard.   Pulmonary:      Breath sounds: Normal breath sounds.   Abdominal:      General: Abdomen is flat. Bowel sounds are normal.      Palpations: Abdomen is soft.   Musculoskeletal:         General: Normal range of motion.      Right lower le+ Pitting Edema present.      Left lower le+ Pitting Edema present.   Lymphadenopathy:      Cervical: Cervical adenopathy present.      Comments: Bilateral cervical nodes anterior and posterior about 1.5 cm in size.  Fullness left inguinal nahum about 5x4 cm   Skin:     General: Skin is warm and dry.   Neurological:      General: No focal deficit present.      Mental Status: She is oriented to person, place, and time.   Psychiatric:         Mood and Affect: Mood normal.         Behavior: Behavior normal.         Thought Content: Thought content normal.     Performance Status:  Symptomatic; fully ambulatory  -------------------------------------------------------------------------------------------------------  Assessment/Plan      89 yo Patient with reported dual  diagnosis of CLL and FCC , however recent flow actually suggests marginal zone ( see above) which is more consistent with her clinical course of indolent disease and circulating lymphocytosis.  CT imaging and clinically stable and weight is better so will continue watchful waiting.  If treatment needed would favor single agent rituxan, although a BTK inhibitor would also be appropriate.     Overall patient is doing fine, continue observation.  Will repeat CT imaging based on clinical findings.    24:  Ongoing increase to WBC at 16.0, ANC 3.84, hgb 11.3, plt 215.  Continues to palpate pea sized lymph nodes to left groin, bilateral cervical, and bilateral clavicle.  No changes  "to lymph nodes since prior visit.  Sylvia reports new \"nasal stiffness\".  She was recently evaluated by her PCP Dr. Valdez who recommended start or loratadine and placed ENT referral.  Discussed new nasal stiffness symptoms with Dr. Meyer as Sylvia has history of nasal pharyngeal biopsy on 7/26/19 with findings of low grade CD5 positive, CD10 negative B Cell lymphoma.  Dr. Meyer recommended Sylvia schedule ENT visit for further evaluation.  Advised follow up visit with Dr. Meyer following ENT visit.  Advised Sylvia and Brigida to contact office after ENT visit so follow up visit can be scheduled.  Understanding verbalized.    WBC   Date Value Ref Range Status   11/12/2024 16.0 (H) 4.4 - 11.3 x10*3/uL Final   02/20/2024 16.8 (H) 4.4 - 11.3 x10*3/uL Final   11/28/2023 9.0 4.4 - 11.3 x10*3/uL Final   08/22/2023 13.1 (H) 4.4 - 11.3 x10E9/L Final   02/28/2023 9.2 4.4 - 11.3 x10E9/L Final   11/01/2022 12.6 (H) 4.4 - 11.3 x10E9/L Final   05/24/2022 11.5 (H) 4.4 - 11.3 x10E9/L Final   02/15/2022 11.1 4.4 - 11.3 x10E9/L Final     Platelets   Date Value Ref Range Status   11/12/2024 215 150 - 450 x10*3/uL Final   02/20/2024 234 150 - 450 x10*3/uL Final   11/28/2023 178 150 - 450 x10*3/uL Final   08/22/2023 207 150 - 450 x10E9/L Final   02/28/2023 200 150 - 450 x10E9/L Final   11/01/2022 212 150 - 450 x10E9/L Final   05/24/2022 220 150 - 450 x10E9/L Final   02/15/2022 224 150 - 450 x10E9/L Final     Hemoglobin   Date Value Ref Range Status   11/12/2024 11.3 (L) 12.0 - 16.0 g/dL Final   02/20/2024 11.2 (L) 12.0 - 16.0 g/dL Final   11/28/2023 11.0 (L) 12.0 - 16.0 g/dL Final   08/22/2023 11.3 (L) 12.0 - 16.0 g/dL Final   02/28/2023 11.1 (L) 12.0 - 16.0 g/dL Final   11/01/2022 11.3 (L) 12.0 - 16.0 g/dL Final   05/24/2022 11.9 (L) 12.0 - 16.0 g/dL Final   02/15/2022 11.8 (L) 12.0 - 16.0 g/dL Final     Healthcare Maintenance:  Received pneumococcal vaccine 5/2/24.  Advised to receive influenza, updated covid, RSV, and " shingles vaccines at local pharmacy.     RT:   Advised to contact office at [phone] after ENT visit to schedule follow up visit with Dr. Meyer.    -------------------------------------------------------------------------------------------------------  ELSA Quinonez-LEWIS

## 2024-11-12 ENCOUNTER — LAB (OUTPATIENT)
Dept: LAB | Facility: CLINIC | Age: 89
End: 2024-11-12
Payer: MEDICARE

## 2024-11-12 ENCOUNTER — OFFICE VISIT (OUTPATIENT)
Dept: HEMATOLOGY/ONCOLOGY | Facility: CLINIC | Age: 89
End: 2024-11-12
Payer: MEDICARE

## 2024-11-12 VITALS
OXYGEN SATURATION: 96 % | TEMPERATURE: 97.2 F | HEART RATE: 69 BPM | DIASTOLIC BLOOD PRESSURE: 69 MMHG | RESPIRATION RATE: 18 BRPM | SYSTOLIC BLOOD PRESSURE: 143 MMHG | BODY MASS INDEX: 27.99 KG/M2 | WEIGHT: 127.09 LBS

## 2024-11-12 DIAGNOSIS — C82.01: ICD-10-CM

## 2024-11-12 DIAGNOSIS — J39.2 NASOPHARYNGEAL MASS: ICD-10-CM

## 2024-11-12 DIAGNOSIS — C82.01: Primary | ICD-10-CM

## 2024-11-12 LAB
ALBUMIN SERPL BCP-MCNC: 4.1 G/DL (ref 3.4–5)
ALP SERPL-CCNC: 65 U/L (ref 33–136)
ALT SERPL W P-5'-P-CCNC: 12 U/L (ref 7–45)
ANION GAP SERPL CALC-SCNC: 14 MMOL/L (ref 10–20)
AST SERPL W P-5'-P-CCNC: 20 U/L (ref 9–39)
BASOPHILS # BLD MANUAL: 0 X10*3/UL (ref 0–0.1)
BASOPHILS NFR BLD MANUAL: 0 %
BILIRUB SERPL-MCNC: 0.5 MG/DL (ref 0–1.2)
BUN SERPL-MCNC: 25 MG/DL (ref 6–23)
CALCIUM SERPL-MCNC: 9.7 MG/DL (ref 8.6–10.6)
CHLORIDE SERPL-SCNC: 105 MMOL/L (ref 98–107)
CO2 SERPL-SCNC: 28 MMOL/L (ref 21–32)
CREAT SERPL-MCNC: 0.85 MG/DL (ref 0.5–1.05)
EGFRCR SERPLBLD CKD-EPI 2021: 66 ML/MIN/1.73M*2
EOSINOPHIL # BLD MANUAL: 0 X10*3/UL (ref 0–0.4)
EOSINOPHIL NFR BLD MANUAL: 0 %
ERYTHROCYTE [DISTWIDTH] IN BLOOD BY AUTOMATED COUNT: 15.6 % (ref 11.5–14.5)
GLUCOSE SERPL-MCNC: 105 MG/DL (ref 74–99)
HCT VFR BLD AUTO: 35.5 % (ref 36–46)
HGB BLD-MCNC: 11.3 G/DL (ref 12–16)
IMM GRANULOCYTES # BLD AUTO: 0.02 X10*3/UL (ref 0–0.5)
IMM GRANULOCYTES NFR BLD AUTO: 0.1 % (ref 0–0.9)
LDH SERPL L TO P-CCNC: 151 U/L (ref 84–246)
LYMPHOCYTES # BLD MANUAL: 11.52 X10*3/UL (ref 0.8–3)
LYMPHOCYTES NFR BLD MANUAL: 72 %
MCH RBC QN AUTO: 29.3 PG (ref 26–34)
MCHC RBC AUTO-ENTMCNC: 31.8 G/DL (ref 32–36)
MCV RBC AUTO: 92 FL (ref 80–100)
MONOCYTES # BLD MANUAL: 0.64 X10*3/UL (ref 0.05–0.8)
MONOCYTES NFR BLD MANUAL: 4 %
NEUTS SEG # BLD MANUAL: 3.84 X10*3/UL (ref 1.6–5)
NEUTS SEG NFR BLD MANUAL: 24 %
NRBC BLD-RTO: ABNORMAL /100{WBCS}
OVALOCYTES BLD QL SMEAR: ABNORMAL
PLATELET # BLD AUTO: 215 X10*3/UL (ref 150–450)
POLYCHROMASIA BLD QL SMEAR: ABNORMAL
POTASSIUM SERPL-SCNC: 4.5 MMOL/L (ref 3.5–5.3)
PROT SERPL-MCNC: 6.5 G/DL (ref 6.4–8.2)
RBC # BLD AUTO: 3.86 X10*6/UL (ref 4–5.2)
RBC MORPH BLD: ABNORMAL
SODIUM SERPL-SCNC: 142 MMOL/L (ref 136–145)
TOTAL CELLS COUNTED BLD: 100
WBC # BLD AUTO: 16 X10*3/UL (ref 4.4–11.3)

## 2024-11-12 PROCEDURE — 1157F ADVNC CARE PLAN IN RCRD: CPT

## 2024-11-12 PROCEDURE — 1126F AMNT PAIN NOTED NONE PRSNT: CPT

## 2024-11-12 PROCEDURE — 1160F RVW MEDS BY RX/DR IN RCRD: CPT

## 2024-11-12 PROCEDURE — 80053 COMPREHEN METABOLIC PANEL: CPT

## 2024-11-12 PROCEDURE — 36415 COLL VENOUS BLD VENIPUNCTURE: CPT

## 2024-11-12 PROCEDURE — 3078F DIAST BP <80 MM HG: CPT

## 2024-11-12 PROCEDURE — 83615 LACTATE (LD) (LDH) ENZYME: CPT

## 2024-11-12 PROCEDURE — 85007 BL SMEAR W/DIFF WBC COUNT: CPT

## 2024-11-12 PROCEDURE — 85027 COMPLETE CBC AUTOMATED: CPT

## 2024-11-12 PROCEDURE — 99214 OFFICE O/P EST MOD 30 MIN: CPT

## 2024-11-12 PROCEDURE — 1159F MED LIST DOCD IN RCRD: CPT

## 2024-11-12 PROCEDURE — 3077F SYST BP >= 140 MM HG: CPT

## 2024-11-12 ASSESSMENT — ENCOUNTER SYMPTOMS
LEG SWELLING: 1
APPETITE CHANGE: 1
VOMITING: 0
DIARRHEA: 0
DIAPHORESIS: 1
CONSTIPATION: 1
ARTHRALGIAS: 0
HEMOPTYSIS: 0
COUGH: 1
FEVER: 0
DEPRESSION: 1
NAUSEA: 1
NUMBNESS: 0
UNEXPECTED WEIGHT CHANGE: 0
WHEEZING: 0
ADENOPATHY: 1
BRUISES/BLEEDS EASILY: 0
SHORTNESS OF BREATH: 1
FATIGUE: 1
LOSS OF SENSATION IN FEET: 0
CHILLS: 0
OCCASIONAL FEELINGS OF UNSTEADINESS: 0

## 2024-11-12 ASSESSMENT — PAIN SCALES - GENERAL: PAINLEVEL_OUTOF10: 0-NO PAIN

## 2024-11-12 NOTE — PROGRESS NOTES
Patient here for follow up accompanied by her /.  Patient has been having fullness in her sinuses and issues sleeping recently.  Patient has had nodules in her sinus area in past and her PCP recommended CT and ENT referral.  Before scheduling with ENT patient and Social Work wanted to discuss with provider.  Montserrat Ray was notified.  Medication and allergies were reviewed.    Education Documentation  Healthy Lifestyle, taught by Yajaira Stuart RN at 11/12/2024  1:50 PM.  Learner: Other, Patient  Readiness: Acceptance  Method: Explanation  Response: Verbalizes Understanding    Nutrition/Diet, taught by Yajaira Stuart RN at 11/12/2024  1:50 PM.  Learner: Other, Patient  Readiness: Acceptance  Method: Explanation  Response: Verbalizes Understanding    General Medication Information, taught by Yajaira Stuart RN at 11/12/2024  1:50 PM.  Learner: Other, Patient  Readiness: Acceptance  Method: Explanation  Response: Verbalizes Understanding    Education Comments  No comments found.

## 2024-11-15 ENCOUNTER — OFFICE VISIT (OUTPATIENT)
Dept: OTOLARYNGOLOGY | Facility: CLINIC | Age: 89
End: 2024-11-15
Payer: MEDICARE

## 2024-11-15 VITALS — WEIGHT: 127 LBS | BODY MASS INDEX: 28.57 KG/M2 | HEIGHT: 56 IN

## 2024-11-15 DIAGNOSIS — C82.90 FOLLICULAR LYMPHOMA, UNSPECIFIED FOLLICULAR LYMPHOMA TYPE, UNSPECIFIED BODY REGION: ICD-10-CM

## 2024-11-15 DIAGNOSIS — J39.2 THROAT IRRITATION: ICD-10-CM

## 2024-11-15 DIAGNOSIS — R09.81 NASAL CONGESTION: Primary | ICD-10-CM

## 2024-11-15 DIAGNOSIS — J45.909 ASTHMA, UNSPECIFIED ASTHMA SEVERITY, UNSPECIFIED WHETHER COMPLICATED, UNSPECIFIED WHETHER PERSISTENT (HHS-HCC): ICD-10-CM

## 2024-11-15 DIAGNOSIS — J39.2 NASOPHARYNGEAL MASS: ICD-10-CM

## 2024-11-15 PROCEDURE — 31231 NASAL ENDOSCOPY DX: CPT

## 2024-11-15 PROCEDURE — 1159F MED LIST DOCD IN RCRD: CPT

## 2024-11-15 PROCEDURE — 99204 OFFICE O/P NEW MOD 45 MIN: CPT

## 2024-11-15 PROCEDURE — 1160F RVW MEDS BY RX/DR IN RCRD: CPT

## 2024-11-15 PROCEDURE — 1157F ADVNC CARE PLAN IN RCRD: CPT

## 2024-11-15 PROCEDURE — 1036F TOBACCO NON-USER: CPT

## 2024-11-15 ASSESSMENT — PATIENT HEALTH QUESTIONNAIRE - PHQ9
2. FEELING DOWN, DEPRESSED OR HOPELESS: NOT AT ALL
1. LITTLE INTEREST OR PLEASURE IN DOING THINGS: NOT AT ALL
SUM OF ALL RESPONSES TO PHQ9 QUESTIONS 1 AND 2: 0

## 2024-11-15 NOTE — PROGRESS NOTES
"Chief Complaint: Congestion.     Referring Provider: Brigida Valdez MD    History of Present Illness:    Sylvia Martinez is a 88 y.o. female, who is [place]yanese speaking. Her son is present to interpret and provide HPI for her. She is presenting for evaluation congestion that she feels is more in her throat and chest and not so much her nose. She reports the congestion is worse at night time when she lays down and results in her coughing up clear, thick mucus. This makes her throat feel irritated. She reports this has been present for a few years. She also reports PND and intermittent bilateral nasal blockage though this does not bother her too much. She does have a known history of follicular lymphoma that she has declined treatment for. Is following with oncology. 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 She denies facial pressure, rhinorrhea, facial pain, periorbital edema, throat clearing, hoarseness, loss of taste, loss of smell, or epistaxis. She denies the use of any nasal sprays, rinses, or ointments in her nose. Does think she has allergic rhinitis or allergies but denies previous allergy testing. She does have asthma. She denies a history of aspirin sensitivity. She reports 0 sinus infections per year requiring antibiotic treatment. She denies recent antibiotic usage.  She reports previously being treated for reflux but not on any medical therapy currently.     History of prior nasal/sinus surgery or procedure: Denies.     I have also reviewed prior note from Brigida Valdez MD dated 11/8/2024 and this is contributing to my history and assessment.  - \"Patient is  presented  for follow up.  Having congestion, weak.  Has low energy, fatigue.  Followed by oncology Dr. Meyer for CLL, follicular lymphoma, seen 5/2024.  Advised to follow-up in 6 months, 11/12/2024.  Has lymphadenopathy, " "stable\".     I have also reviewed prior note from Montserrat Ray CNP dated 11/12/2024 and this is contributing to my history and assessment.   - \"Sylvia presents to the clinic today (11/12/24) with her  Brigida for follow up evaluation and count check.  Brigida has worked with Sylvia since 2017 and assists with interpreting for the patient and assists with setting up appointments.  Sylvia is refusing use of interpretor today for visit.  Sylvia lives alone,  states he son helps her. Reports \"stiffness in nose\", which makes it more difficult to breath and is having mucous.  The stiffness used to occur more intermittently but has gotten worse recently. States she has a history of asthma, has been using inhaler which is not helping.  Sylvia has an appt with PCP Dr. Valdez on 11/8/24, who recommended she start taking loratadine, and placed an ENT referral. She reports ongoing night sweats every night, not drenching.  Energy level is lower.  Uses cane for ambulation, no recent falls.  Continues to have lower appetite.  Weight is stable.  Has bowel issues with gluten.  Reports intermittent nausea.  Constipation uses senna and miralax.  Continues to have shortness of breath and non-productive cough.  Ongoing edema to bilateral legs, worse in the evening.  Continues to have enlarged lymph to neck and groin, staying the same size and are non-tender\".    I have also reviewed prior note from Abdi Booker MD dated 12/18/2019 and this is contributing to my history and assessment.   - \"This lady was seen at the request of her medical oncologist. She was diagnosed with a low-grade lymphoma back in 2017. Recently she was found to have a nasopharyngeal lesion and also a right neck mass which seems to be increasing in size. I personally reviewed the CT scan of the neck that was done in April 2019. It did show some prominent tissue in the nasopharynx and a prominent lymph node in the right posterior triangle. In " "July 2019 she was brought to the operating room and a nasopharyngeal mass was biopsied. It was consistent with lymphoma. It was elected to follow this clinically. The patient has no complaints\".       I personally reviewed the patients CT scan results. I discussed the results personally with the patient. The following findings were discussed:(10/29/2021): (CT soft tissue neck w IV contrast): Report reads \"Scattered paranasal sinus mucosal thickening. The mastoid air cells are clear\". No images available in PACs.   ?  Review of Systems:    Review of symptoms was negative except for those stated including Cardiopulmonary, Genitourinary, Gastrointestinal, Psychological, Sleep pattern, Endocrine, Eyes, Neurologic, Musculoskeletal, Skin, Hematologic/Lymphatic and Allergic/Immunologic.     Medical History:    I have reviewed the patient's updated past medical history, surgical history, family history, social history, as well as current medications and allergies as of 11/15/2024. Changes to these items have been updated and marked as reviewed in the electronic medical record.    Physical Exam:    ONSTITUTIONAL: Vital signs reviewed. Patient appears well developed and well nourished.   GENERAL: this is a healthy appearing female who appears stated age. The patient is alert and appropriately verbally conversant without hoarseness. This patient is in no apparent distress.   FACE: The face was inspected and no cutaneous masses or lesions were visualized. There was no erythema or edema noted. Facial movement was symmetric. No skin lesions were detected. There was no sinus tenderness elicited. TMJ crepitus present.   EYES: Extra-ocular muscle function was intact. No nystagmus was observed. Pupils were equal.   CRANIAL NERVES: Cranial nerves II, III, IV, and VI were noted to be intact via extra-ocular muscle movement testing. Cranial nerve VII noted to be intact and symmetric by facial movement. Cranial nerves IX and X noted to " be intact by gag reflex and palatal movement. Cranial nerve XII noted to be intact by active and symmetric tongue movement.   NOSE: Examination of the nose revealed the nasal dorsum to be midline. Intranasal exam reveals the septum appears to be deviated left. The inferior turbinates were hypertrophic. No masses, polyps, mucopus, or other lesion on anterior rhinoscopy. See below procedure note as applicable for further exam.  ORAL CAVITY: Examination of the oral cavity revealed no mass lesions nor infection. The palate was noted to be intact. The tongue exhibited normal mobility. Mucosa was moist without lesion. The lips were free of lesion. Gums were free of inflammation. Dentition: normal without obvious infection or inflammation  OROPHARYNX: The oral pharynx was not well visualized due to patient tolerance.   EARS: Patient has bilateral hearing aids and did not wish to remove for exam.   NECK: Visualization and palpation of the neck revealed no mass lesions. No skin lesions or inflammatory processes were detected. The cervical musculature was normal to palpation.   CERVICAL LYMPHATICS: There were no palpable lymph nodes in the posterior triangle, submandibular triangle, jugulodigastric region, or central neck.  RESPIRATORY: Normal inspiration and expiration and chest wall expansion, no use of accessory muscles to breathe, no stridor.  NEUROLOGICAL: Patient is ambulatory without assist. Mentation is clear. Answering questions appropriately.    Nasal / sinus endoscopy    Date/Time:     Performed by: Deb Carreno RN, BSN, APRN - CNP.   Authorized by:  Deb Carreno RN, BSN, APRN - CNP.   Consent:     Consent obtained:  Verbal    Consent given by:  Patient    Risks discussed:  Bleeding, infection and pain    Alternatives discussed:  No treatment, observation and delayed treatment  Procedure details:     Indications: assessment of airway and sino-nasal symptoms      Medication:  Afrin.     Instrument: flexible fiberoptic  nasal endoscope      Scope location: bilateral nare    Post-procedure details:     Patient tolerance of procedure:  Tolerated well, no immediate complications  Comments:      Findings: After topical decongestion with decongestant spray, nasal endoscopy was performed using an endoscope. The septum appeared deviated left. The inferior turbinates were hypertrophic.  The middle turbinates appeared healthy, the middle meatus is free of purulence, masses, lesions or polyps. There are clear secretions at the middle meatus and sphenoethmoid recess bilaterally. The superior meatus and sphenoethmoid recess are clear bilaterally. The nasal passageway is patent. The nasopharynx was nearly obstructed by a large mass bilaterally that is assumed to be consistent with the nasopharyngeal mass that was previously biopsied by Dr. Booker in 2019. There is concern that this mass has increased in size since then. There were no complications and the patient tolerated the procedure well.    Assessment:     Sylvia Martinez is a 88 y.o. female with complaints of PND, intermittent nasal blockage, and congestion that she feels is coming more from her chest than her nose. Known history of follicular lymphoma that she has declined treatment for. 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 Declined nasal sprays. Declined Gaviscon. Patient requested referral to laryngology. Will reach out to her oncology team regarding nasopharyngeal mass.     Plan:      Nasal endoscopy: Findings as noted above.   1.) I discussed with the patient various etiologies for PND, nasal blockage, and congestion. We discussed that her symptoms could possibly be allergy driven. For this she was offered a trial of a steroid nasal spray and Azelastine which she declined at this time.   2.) We also discussed that her  symptoms could possibly be the result of silent reflux. For this she was offered a prescription for Gaviscon which was also declined.   3.) Her son is requesting a laryngology referral for further evaluation of her throat. Referral placed.  4.) I discussed her scope exam with Dr. Danny Morel. I recommend she follow up with us in 4-6 months for continued monitoring of the nasopharyngeal mass. I will reach out to her oncology team to see if they would recommend further imaging or intervention for this.     Deb Carreno CNP.   Otolaryngology - Head & Neck Surgery  Division of Rhinology and Endoscopic Skull Base Surgery  [facility]

## 2024-11-15 NOTE — PATIENT INSTRUCTIONS
Begin Gaviscon after meals and at bedtime. This is a tablet that is over-the-counter and acts to sit on top of the contents of the stomach to prevent regurgitation.

## 2024-11-16 DIAGNOSIS — C82.90 FOLLICULAR LYMPHOMA, UNSPECIFIED FOLLICULAR LYMPHOMA TYPE, UNSPECIFIED BODY REGION: Primary | ICD-10-CM

## 2024-11-16 DIAGNOSIS — J39.2 NASOPHARYNGEAL MASS: ICD-10-CM

## 2024-11-16 DIAGNOSIS — C85.80 MARGINAL ZONE LYMPHOMA (MULTI): ICD-10-CM

## 2024-11-18 ENCOUNTER — TELEPHONE (OUTPATIENT)
Dept: HEMATOLOGY/ONCOLOGY | Facility: HOSPITAL | Age: 89
End: 2024-11-18
Payer: MEDICARE

## 2024-11-19 ENCOUNTER — TELEPHONE (OUTPATIENT)
Dept: OTOLARYNGOLOGY | Facility: HOSPITAL | Age: 89
End: 2024-11-19
Payer: MEDICARE

## 2024-11-19 NOTE — TELEPHONE ENCOUNTER
Received referral from Deb Carreno. Per Deb, pt needs appt with Dr. Morel to discuss obtaining a biopsy of a nasopharyngeal mass. Left VM with  (indicated by Deb) to call back and schedule.

## 2024-11-22 PROCEDURE — G0179 MD RECERTIFICATION HHA PT: HCPCS | Performed by: INTERNAL MEDICINE

## 2024-12-02 ENCOUNTER — HOSPITAL ENCOUNTER (OUTPATIENT)
Dept: RADIOLOGY | Facility: CLINIC | Age: 89
Discharge: HOME | End: 2024-12-02
Payer: MEDICARE

## 2024-12-02 ENCOUNTER — TELEPHONE (OUTPATIENT)
Dept: HEMATOLOGY/ONCOLOGY | Facility: HOSPITAL | Age: 89
End: 2024-12-02
Payer: MEDICARE

## 2024-12-02 DIAGNOSIS — C85.80 MARGINAL ZONE LYMPHOMA (MULTI): ICD-10-CM

## 2024-12-02 DIAGNOSIS — C82.90 FOLLICULAR LYMPHOMA, UNSPECIFIED FOLLICULAR LYMPHOMA TYPE, UNSPECIFIED BODY REGION: ICD-10-CM

## 2024-12-02 DIAGNOSIS — J39.2 NASOPHARYNGEAL MASS: ICD-10-CM

## 2024-12-02 PROCEDURE — 2550000001 HC RX 255 CONTRASTS

## 2024-12-02 PROCEDURE — 74177 CT ABD & PELVIS W/CONTRAST: CPT

## 2024-12-02 PROCEDURE — 74177 CT ABD & PELVIS W/CONTRAST: CPT | Performed by: RADIOLOGY

## 2024-12-02 PROCEDURE — 70487 CT MAXILLOFACIAL W/DYE: CPT

## 2024-12-02 PROCEDURE — 71260 CT THORAX DX C+: CPT | Performed by: RADIOLOGY

## 2024-12-02 PROCEDURE — 70491 CT SOFT TISSUE NECK W/DYE: CPT

## 2024-12-02 PROCEDURE — 70491 CT SOFT TISSUE NECK W/DYE: CPT | Performed by: RADIOLOGY

## 2024-12-02 PROCEDURE — 70487 CT MAXILLOFACIAL W/DYE: CPT | Performed by: RADIOLOGY

## 2024-12-02 NOTE — TELEPHONE ENCOUNTER
Brigida calls to let you know that she did the CT scan which  ordered.    The results will be in 24 hours.    She was told to call to find out when she should schedule with Dr. Meyer to discuss results?    Please advise    Message sent

## 2024-12-02 NOTE — TELEPHONE ENCOUNTER
Per Montserrat Ray:    Minh.  Thank you for the update on getting the CT scan.  We will watch for the results and then can see when we can fit Sylvia on the schedule.  Is she able to come to [place] or would she only want to be seen at [place]?   Call to Brigida and I left a message on an identified voice mail with instructions to call back with preference of location.

## 2024-12-09 ASSESSMENT — ENCOUNTER SYMPTOMS
ADENOPATHY: 1
BRUISES/BLEEDS EASILY: 0
LEG SWELLING: 1
FEVER: 0
CONSTIPATION: 1
FATIGUE: 1
ARTHRALGIAS: 0
DIARRHEA: 0
NUMBNESS: 0
WHEEZING: 0
SHORTNESS OF BREATH: 1
COUGH: 1
CHILLS: 0
NAUSEA: 1
UNEXPECTED WEIGHT CHANGE: 0
APPETITE CHANGE: 1
DIAPHORESIS: 1
HEMOPTYSIS: 0
VOMITING: 0

## 2024-12-10 ENCOUNTER — OFFICE VISIT (OUTPATIENT)
Dept: HEMATOLOGY/ONCOLOGY | Facility: CLINIC | Age: 89
End: 2024-12-10
Payer: MEDICARE

## 2024-12-10 VITALS
BODY MASS INDEX: 28.35 KG/M2 | RESPIRATION RATE: 18 BRPM | WEIGHT: 126.43 LBS | TEMPERATURE: 97.5 F | HEART RATE: 63 BPM | OXYGEN SATURATION: 95 % | SYSTOLIC BLOOD PRESSURE: 129 MMHG | DIASTOLIC BLOOD PRESSURE: 74 MMHG

## 2024-12-10 DIAGNOSIS — C85.80 MARGINAL ZONE LYMPHOMA (MULTI): Primary | ICD-10-CM

## 2024-12-10 PROCEDURE — 1157F ADVNC CARE PLAN IN RCRD: CPT | Performed by: INTERNAL MEDICINE

## 2024-12-10 PROCEDURE — 3078F DIAST BP <80 MM HG: CPT | Performed by: INTERNAL MEDICINE

## 2024-12-10 PROCEDURE — 1126F AMNT PAIN NOTED NONE PRSNT: CPT | Performed by: INTERNAL MEDICINE

## 2024-12-10 PROCEDURE — 1159F MED LIST DOCD IN RCRD: CPT | Performed by: INTERNAL MEDICINE

## 2024-12-10 PROCEDURE — 3074F SYST BP LT 130 MM HG: CPT | Performed by: INTERNAL MEDICINE

## 2024-12-10 PROCEDURE — 99215 OFFICE O/P EST HI 40 MIN: CPT | Performed by: INTERNAL MEDICINE

## 2024-12-10 RX ORDER — PROCHLORPERAZINE EDISYLATE 5 MG/ML
10 INJECTION INTRAMUSCULAR; INTRAVENOUS EVERY 6 HOURS PRN
OUTPATIENT
Start: 2025-01-21

## 2024-12-10 RX ORDER — PROCHLORPERAZINE MALEATE 10 MG
10 TABLET ORAL EVERY 6 HOURS PRN
OUTPATIENT
Start: 2025-02-04

## 2024-12-10 RX ORDER — ACETAMINOPHEN 325 MG/1
650 TABLET ORAL ONCE
OUTPATIENT
Start: 2025-02-04

## 2024-12-10 RX ORDER — EPINEPHRINE 0.3 MG/.3ML
0.3 INJECTION SUBCUTANEOUS EVERY 5 MIN PRN
OUTPATIENT
Start: 2025-02-04

## 2024-12-10 RX ORDER — ALBUTEROL SULFATE 0.83 MG/ML
3 SOLUTION RESPIRATORY (INHALATION) AS NEEDED
OUTPATIENT
Start: 2025-01-21

## 2024-12-10 RX ORDER — ALBUTEROL SULFATE 0.83 MG/ML
3 SOLUTION RESPIRATORY (INHALATION) AS NEEDED
OUTPATIENT
Start: 2025-01-28

## 2024-12-10 RX ORDER — PROCHLORPERAZINE EDISYLATE 5 MG/ML
10 INJECTION INTRAMUSCULAR; INTRAVENOUS EVERY 6 HOURS PRN
OUTPATIENT
Start: 2025-01-28

## 2024-12-10 RX ORDER — EPINEPHRINE 0.3 MG/.3ML
0.3 INJECTION SUBCUTANEOUS EVERY 5 MIN PRN
OUTPATIENT
Start: 2025-01-14

## 2024-12-10 RX ORDER — FAMOTIDINE 10 MG/ML
20 INJECTION INTRAVENOUS ONCE AS NEEDED
OUTPATIENT
Start: 2025-01-28

## 2024-12-10 RX ORDER — PROCHLORPERAZINE EDISYLATE 5 MG/ML
10 INJECTION INTRAMUSCULAR; INTRAVENOUS EVERY 6 HOURS PRN
OUTPATIENT
Start: 2025-02-04

## 2024-12-10 RX ORDER — DIPHENHYDRAMINE HCL 50 MG
50 CAPSULE ORAL ONCE
OUTPATIENT
Start: 2025-01-21

## 2024-12-10 RX ORDER — DIPHENHYDRAMINE HYDROCHLORIDE 50 MG/ML
50 INJECTION INTRAMUSCULAR; INTRAVENOUS AS NEEDED
OUTPATIENT
Start: 2025-01-14

## 2024-12-10 RX ORDER — HEPARIN 100 UNIT/ML
500 SYRINGE INTRAVENOUS AS NEEDED
OUTPATIENT
Start: 2025-01-14

## 2024-12-10 RX ORDER — ACETAMINOPHEN 325 MG/1
650 TABLET ORAL ONCE
OUTPATIENT
Start: 2025-01-14

## 2024-12-10 RX ORDER — FAMOTIDINE 10 MG/ML
20 INJECTION INTRAVENOUS ONCE AS NEEDED
OUTPATIENT
Start: 2025-01-21

## 2024-12-10 RX ORDER — DIPHENHYDRAMINE HCL 50 MG
50 CAPSULE ORAL ONCE
OUTPATIENT
Start: 2025-01-14

## 2024-12-10 RX ORDER — ALBUTEROL SULFATE 0.83 MG/ML
3 SOLUTION RESPIRATORY (INHALATION) AS NEEDED
OUTPATIENT
Start: 2025-02-04

## 2024-12-10 RX ORDER — ACETAMINOPHEN 325 MG/1
650 TABLET ORAL ONCE
OUTPATIENT
Start: 2025-01-28

## 2024-12-10 RX ORDER — PROCHLORPERAZINE MALEATE 10 MG
10 TABLET ORAL EVERY 6 HOURS PRN
OUTPATIENT
Start: 2025-01-14

## 2024-12-10 RX ORDER — EPINEPHRINE 0.3 MG/.3ML
0.3 INJECTION SUBCUTANEOUS EVERY 5 MIN PRN
OUTPATIENT
Start: 2025-01-21

## 2024-12-10 RX ORDER — DIPHENHYDRAMINE HYDROCHLORIDE 50 MG/ML
50 INJECTION INTRAMUSCULAR; INTRAVENOUS AS NEEDED
OUTPATIENT
Start: 2025-01-28

## 2024-12-10 RX ORDER — PROCHLORPERAZINE EDISYLATE 5 MG/ML
10 INJECTION INTRAMUSCULAR; INTRAVENOUS EVERY 6 HOURS PRN
OUTPATIENT
Start: 2025-01-14

## 2024-12-10 RX ORDER — FAMOTIDINE 10 MG/ML
20 INJECTION INTRAVENOUS ONCE AS NEEDED
OUTPATIENT
Start: 2025-01-14

## 2024-12-10 RX ORDER — PROCHLORPERAZINE MALEATE 10 MG
10 TABLET ORAL EVERY 6 HOURS PRN
OUTPATIENT
Start: 2025-01-21

## 2024-12-10 RX ORDER — DIPHENHYDRAMINE HCL 50 MG
50 CAPSULE ORAL ONCE
OUTPATIENT
Start: 2025-01-28

## 2024-12-10 RX ORDER — ALBUTEROL SULFATE 0.83 MG/ML
3 SOLUTION RESPIRATORY (INHALATION) AS NEEDED
OUTPATIENT
Start: 2025-01-14

## 2024-12-10 RX ORDER — PROCHLORPERAZINE MALEATE 10 MG
10 TABLET ORAL EVERY 6 HOURS PRN
OUTPATIENT
Start: 2025-01-28

## 2024-12-10 RX ORDER — FAMOTIDINE 10 MG/ML
20 INJECTION INTRAVENOUS ONCE AS NEEDED
OUTPATIENT
Start: 2025-02-04

## 2024-12-10 RX ORDER — HEPARIN SODIUM,PORCINE/PF 10 UNIT/ML
50 SYRINGE (ML) INTRAVENOUS AS NEEDED
OUTPATIENT
Start: 2025-01-14

## 2024-12-10 RX ORDER — DIPHENHYDRAMINE HYDROCHLORIDE 50 MG/ML
50 INJECTION INTRAMUSCULAR; INTRAVENOUS AS NEEDED
OUTPATIENT
Start: 2025-01-21

## 2024-12-10 RX ORDER — ACETAMINOPHEN 325 MG/1
650 TABLET ORAL ONCE
OUTPATIENT
Start: 2025-01-21

## 2024-12-10 RX ORDER — DIPHENHYDRAMINE HYDROCHLORIDE 50 MG/ML
50 INJECTION INTRAMUSCULAR; INTRAVENOUS AS NEEDED
OUTPATIENT
Start: 2025-02-04

## 2024-12-10 RX ORDER — DIPHENHYDRAMINE HCL 50 MG
50 CAPSULE ORAL ONCE
OUTPATIENT
Start: 2025-02-04

## 2024-12-10 RX ORDER — EPINEPHRINE 0.3 MG/.3ML
0.3 INJECTION SUBCUTANEOUS EVERY 5 MIN PRN
OUTPATIENT
Start: 2025-01-28

## 2024-12-10 ASSESSMENT — ENCOUNTER SYMPTOMS
LOSS OF SENSATION IN FEET: 0
DEPRESSION: 1
OCCASIONAL FEELINGS OF UNSTEADINESS: 0

## 2024-12-10 ASSESSMENT — PAIN SCALES - GENERAL: PAINLEVEL_OUTOF10: 0-NO PAIN

## 2024-12-10 NOTE — PROGRESS NOTES
Patient ID: Sylvia Martinez is a 89 y.o. female.    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      Patient had a followup CT scan of neck, chest, abdomen and pelvis  on 11/1/21 which shows that the majoriy of laila disease is stable to smaller. The largest node measures 3.2 cm in the short axis as described above. slowly increasing nodes on imaging  6/2022 8/22/23  Stable weight from last visit.  Multiple 2-3 cm cervical notes and 5x5 cm left inguinal nodes increased adenpathy by exam but assymptomatic,   Have ordered PET scan and plan laila biopsy as patient has dual dx of CLL and FCC to determine most appropratie treatment.     PET scan showed diffuse lymphadenopathy with SUV 4.5 to 4.8  with left pelvic side wall mass SUV of 4.5    Restaging evaluation November 2023    CT scan in ER 10/14/23   1.  Rectal and sigmoid colon wall thickening concerning for   proctitis/colitis.   2. There are a few enlarged lymph nodes noted in the left para-aortic   region, left common iliac chain and left external iliac chain.   3.  Nonspecific splenic hypodense lesion.   Additional database sent 11/28/23 on peripheral blood:  Analysis of peripheral blood 11/28/28: Immunophenotypic findings consistent with CD5 positive B cell lymphoproliferative disorder, A CD5-negative B cell clone was not observed in this analysis. The phenotype is  atypical for chronic lymphocytic leukemia. The differential diagnosis by phenotype includes mantle cell lymphoma and a CD5+ lymphoplasmacytic or marginal zone lymphoma. Previous tissue biopsy studies did not identify expression of Cyclin D1 or SOX11 suggesting mantle cell lymphoma was unlikely.    Focused Lymphoma NGS Results    DISEASE ASSOCIATED GENOMIC FINDINGS:   BIRC3 p.S235Zoa*3 ([id number] c.[id number])  BIRC3 p.U245Ogs*34 ([id number] c.[id number])  NFKBIE p.F682Bjv*13 ([id number] c.[id number])        Follicular lymphoma   4/8/2023 Initial Diagnosis    Follicular lymphoma (CMS/HCC)         Response:     Past Medical History:     Past Medical History:   Diagnosis Date    Abnormal finding of blood chemistry, unspecified     Abnormal blood chemistry    Bladder disorder, unspecified 08/16/2016    Bladder disorder    Dyspnea, unspecified 10/11/2016    Dyspnea    Other cervical disc degeneration, unspecified cervical region 10/11/2016    Degenerative cervical disc    Other specified anxiety disorders 05/01/2019    Depression with anxiety    Other specified diseases of intestine 06/25/2017    Bacterial overgrowth syndrome    Pain in unspecified joint 07/12/2016    Joint pain    Pain in unspecified joint     Arthralgia    Palpitations 06/01/2016    Palpitations    Personal history of diseases of the blood and blood-forming organs and certain disorders involving the immune mechanism     History of anemia    Personal history of other diseases of the circulatory system 05/02/2017    H/O atrial dilatation    Personal history of other diseases of the circulatory system 10/11/2016    History of hypertension    Personal history of other diseases of the digestive system 05/02/2017    History of acute pancreatitis    Personal history of other diseases of the digestive system 07/12/2016    History of diverticulitis of colon    Personal history of other diseases of the digestive system 10/11/2016    History of  hemorrhoids    Personal history of other diseases of the digestive system     History of gastroesophageal reflux (GERD)    Personal history of other diseases of the digestive system 11/08/2016    History of acute pancreatitis    Personal history of other diseases of the digestive system 10/11/2016    History of hiatal hernia    Personal history of other diseases of the musculoskeletal system and connective tissue     History of low back pain    Personal history of other diseases of the musculoskeletal system and connective tissue     History of osteoarthritis    Personal history of other diseases of the musculoskeletal system and connective tissue     History of osteopenia    Personal history of other diseases of the respiratory system 05/02/2017    History of chronic obstructive lung disease    Personal history of other diseases of the respiratory system 07/12/2016    H/O acute bronchitis    Personal history of other endocrine, nutritional and metabolic disease 05/02/2017    History of diabetes mellitus    Personal history of other endocrine, nutritional and metabolic disease 10/11/2016    History of hypothyroidism    Personal history of other endocrine, nutritional and metabolic disease     History of thyroid disease    Personal history of other endocrine, nutritional and metabolic disease 07/12/2016    History of hyperlipidemia    Personal history of other endocrine, nutritional and metabolic disease 10/11/2016    History of hyperlipidemia    Personal history of other mental and behavioral disorders 07/12/2016    History of depression    Personal history of other mental and behavioral disorders 10/11/2016    History of depression    Personal history of other specified conditions 10/11/2016    History of headache    Personal history of other specified conditions     Personal history of multiple pulmonary nodules    Personal history of other specified conditions 10/11/2016    History of shortness of breath     Personal history of other specified conditions     History of vertigo    Postnasal drip 10/24/2014    Postnasal drip    Presence of external hearing-aid     Wears hearing aid    Unspecified abdominal pain 08/08/2022    Abdominal pain       Surgical History:     Past Surgical History:   Procedure Laterality Date    CHOLECYSTECTOMY  05/02/2017    Cholecystectomy Laparoscopic    COLONOSCOPY  01/30/2014    Colonoscopy (Fiberoptic)    ESOPHAGOGASTRODUODENOSCOPY  10/11/2016    Diagnostic Esophagogastroduodenoscopy    GALLBLADDER SURGERY  10/03/2013    Gallbladder Surgery    HERNIA REPAIR  10/11/2016    Hernia Repair    HYSTERECTOMY  05/02/2017    Hysterectomy    OTHER SURGICAL HISTORY  01/02/2022    Loop recorder insertion    OTHER SURGICAL HISTORY  11/08/2016    Anterior Gastropexy For Hiatal Hernia    OTHER SURGICAL HISTORY  01/30/2014    Stress Test ECG Performed        Family History:     Family History   Problem Relation Name Age of Onset    Stroke Mother      Diabetes Other FAMILY HISTORY         TYPE 2    Hypertension Other FAMILY HISTORY         ESSENTIAL, BENIGN    Coronary artery disease Other FAMILY HISTORY        Social History:  Hungarian speaking.  Has  (Brigida) who comes to visits with her.  Okay to call Brigida regarding care.    Social History     Tobacco Use    Smoking status: Never     Passive exposure: Never    Smokeless tobacco: Never   Vaping Use    Vaping status: Never Used   Substance Use Topics    Alcohol use: Never    Drug use: Never      -------------------------------------------------------------------------------------------------------  Subjective       HPI    Sylvia Martinez is a 89 yo woman here today  with her caregiver Brigida for followup of low grade b cell lymphoma, recall patient declined laila biopsy in the fall ordered for progressive pelvic adenopathy and ongoing weight loss.  Peripheral blood sent last visit appears to be consistent with marginal zone lymphoma which I  suspect she has had all along. Patient was seen in the ER for colitis in October 2023  treated with cipro and flagyl and a CT of abdomen and pelvis done at that time shows a few enlarged nodes in left paraaortic, iliac and external iliac chain 1-2 cm in size.      CT scan in ER 10/14/23     1.  Rectal and sigmoid colon wall thickening concerning for   proctitis/colitis.   2. There are a few enlarged lymph nodes noted in the left para-aortic   region, left common iliac chain and left external iliac chain.   3.  Nonspecific splenic hypodense lesion.   Additional database sent 11/28/23 on peripheral blood:     Peripheral blood flow 11/2023: Immunophenotypic findings consistent with CD5 positive B cell lymphoproliferative disorder, see note:   Note: A CD5-negative B cell clone was not observed in this analysis. The phenotype is atypical for chronic lymphocytic leukemia. The differential diagnosis by phenotype includes mantle cell lymphoma and a CD5+ lymphoplasmacytic or marginal zone lymphoma. Previous tissue biopsy studies did not identify expression of Cyclin D1 or SOX11 suggesting mantle cell lymphoma was unlikely.    Focused Lymphoma NGS Results    DISEASE ASSOCIATED GENOMIC FINDINGS:   BIRC3 p.B850Alr*3 ([id number] c.[id number])  BIRC3 p.Q359Lzr*34 ([id number] c.[id number])  NFKBIE p.E631Yzi*13 ([id number] c.[id number])     Sylvia presents to the clinic today (12/10/24) with her  Brigida for follow up evaluation and count check and to discuss treatment options for progressive marginal zone lymphoma.   Last visit patient complained of stiffness in her nose and since this was one of the original sites of her lymphoma a CT of sinuses 12/2/24 showed increase in size of mass midline nasopharynx from 1.8 to 2.4 cm and slight increase in other neck nodes. Similarly CT scan of chest, abdomen and pelvis showed increased lymphadenopathy in multiple laila areas.     She is very depressed today after her  younger brother was found to have committed suicide last week.       She denies any new symptoms and states that her sinus stiffness is improved.       Review of Systems   Constitutional:  Positive for appetite change, diaphoresis and fatigue. Negative for chills, fever and unexpected weight change.   Respiratory:  Positive for cough and shortness of breath. Negative for hemoptysis and wheezing.    Cardiovascular:  Positive for leg swelling.   Gastrointestinal:  Positive for constipation and nausea. Negative for diarrhea and vomiting.   Genitourinary: Negative.     Musculoskeletal:  Positive for gait problem. Negative for arthralgias.   Skin: Negative.    Neurological:  Positive for gait problem. Negative for numbness.   Hematological:  Positive for adenopathy. Does not bruise/bleed easily.   All other systems reviewed and are negative.  -------------------------------------------------------------------------------------------------------  Objective   BSA: There is no height or weight on file to calculate BSA.  There were no vitals taken for this visit.    Physical Exam  Constitutional:       Appearance: Normal appearance.      Comments: Petite, alert and conversant, Cook Islander speaking   HENT:      Head: Normocephalic and atraumatic.      Nose: Nose normal.      Comments: No visible masses to bilateral nostrils.  Eyes:      Extraocular Movements: Extraocular movements intact.      Conjunctiva/sclera: Conjunctivae normal.      Pupils: Pupils are equal, round, and reactive to light.   Cardiovascular:      Rate and Rhythm: Normal rate and regular rhythm.      Heart sounds: Murmur (2/6 RUMA) heard.   Pulmonary:      Breath sounds: Normal breath sounds.   Abdominal:      General: Abdomen is flat. Bowel sounds are normal.      Palpations: Abdomen is soft.   Musculoskeletal:         General: Normal range of motion.      Right lower le+ Pitting Edema present.      Left lower le+ Pitting Edema present.    Lymphadenopathy:      Cervical: Cervical adenopathy present.      Comments: Bilateral cervical nodes anterior and posterior about 1.5 cm in size.  Fullness left inguinal nahum about 5x4 cm   Skin:     General: Skin is warm and dry.   Neurological:      General: No focal deficit present.      Mental Status: She is oriented to person, place, and time.   Psychiatric:         Mood and Affect: Mood normal.         Behavior: Behavior normal.         Thought Content: Thought content normal.   Performance Status:  Symptomatic; fully ambulatory  -------------------------------------------------------------------------------------------------------  Assessment/Plan      89 yo Patient with reported dual  diagnosis of CLL and FCC , however recent flow actually suggests marginal zone ( see above) which is more consistent with her clinical course of indolent disease and circulating lymphocytosis.  CT imaging and clinically stable and weight is better so will continue watchful waiting.  If treatment needed would favor single agent rituxan, although a BTK inhibitor would also be appropriate.     Overall patient is doing fine, continue observation.  Will repeat CT imaging based on clinical findings.    12/10/24:  Followup of marginal zone lymphoma with increase in nasopharyngeal mass which was visible on exam.  Sylvia has history of nasal pharyngeal biopsy on 7/26/19 with findings of low grade CD5 positive, CD10 negative B Cell lymphoma. CT imaging shows ongoing increase in diffuse lymphadenopathy.  I have no concerns for transformed disease.  I have recommended single agent rituxan will start with 4 weekly course.  I reviewed side effects and schedule of rituxan in detail with the patient and her  and provided written information in english and Swazi. Side effects including increased risk of infection and allergic reactions discussed.  Patient signed short chemo consent and tentatively schedule dose 1 on  Tuesday  January 14.  I encouraged patient to have her son accompany her to her first treatment as her  Brigida cannot stay with her all day. Patient was unable to stay for lab work today    WBC   Date Value Ref Range Status   11/12/2024 16.0 (H) 4.4 - 11.3 x10*3/uL Final   02/20/2024 16.8 (H) 4.4 - 11.3 x10*3/uL Final   11/28/2023 9.0 4.4 - 11.3 x10*3/uL Final   08/22/2023 13.1 (H) 4.4 - 11.3 x10E9/L Final   02/28/2023 9.2 4.4 - 11.3 x10E9/L Final   11/01/2022 12.6 (H) 4.4 - 11.3 x10E9/L Final   05/24/2022 11.5 (H) 4.4 - 11.3 x10E9/L Final   02/15/2022 11.1 4.4 - 11.3 x10E9/L Final     Platelets   Date Value Ref Range Status   11/12/2024 215 150 - 450 x10*3/uL Final   02/20/2024 234 150 - 450 x10*3/uL Final   11/28/2023 178 150 - 450 x10*3/uL Final   08/22/2023 207 150 - 450 x10E9/L Final   02/28/2023 200 150 - 450 x10E9/L Final   11/01/2022 212 150 - 450 x10E9/L Final   05/24/2022 220 150 - 450 x10E9/L Final   02/15/2022 224 150 - 450 x10E9/L Final     Hemoglobin   Date Value Ref Range Status   11/12/2024 11.3 (L) 12.0 - 16.0 g/dL Final   02/20/2024 11.2 (L) 12.0 - 16.0 g/dL Final   11/28/2023 11.0 (L) 12.0 - 16.0 g/dL Final   08/22/2023 11.3 (L) 12.0 - 16.0 g/dL Final   02/28/2023 11.1 (L) 12.0 - 16.0 g/dL Final   11/01/2022 11.3 (L) 12.0 - 16.0 g/dL Final   05/24/2022 11.9 (L) 12.0 - 16.0 g/dL Final   02/15/2022 11.8 (L) 12.0 - 16.0 g/dL Final     Healthcare Maintenance:  Received pneumococcal vaccine 5/2/24.  Advised to receive influenza, updated covid, RSV, and shingles vaccines at local pharmacy.     RTC: January 14, 2025 for first dose of rituxan    -------------------------------------------------------------------------------------------------------  Liyah Meyer MD

## 2024-12-12 ENCOUNTER — TELEPHONE (OUTPATIENT)
Dept: HEMATOLOGY/ONCOLOGY | Facility: HOSPITAL | Age: 89
End: 2024-12-12
Payer: MEDICARE

## 2024-12-12 NOTE — TELEPHONE ENCOUNTER
Spoke with Brigida, patient's .  Informed her of upcoming appointment with Dr. Meyer and for patient's first Rituximab infusion.  Appointments will be mailed to patient along with safe handling and precautions in Gabonese language for patient.  Confirmed with Brigida that patient still needs to get her labs that Dr. Meyer ordered on 12/10.  Brigida states that she will have patient come to [facility] lab tomorrow.  Brigida has no further questions at this time.

## 2024-12-13 ENCOUNTER — LAB (OUTPATIENT)
Dept: LAB | Facility: CLINIC | Age: 89
End: 2024-12-13
Payer: MEDICARE

## 2024-12-13 DIAGNOSIS — C85.80 MARGINAL ZONE LYMPHOMA (MULTI): ICD-10-CM

## 2024-12-13 DIAGNOSIS — C82.01: ICD-10-CM

## 2024-12-13 LAB
ALBUMIN SERPL BCP-MCNC: 4.1 G/DL (ref 3.4–5)
ALP SERPL-CCNC: 65 U/L (ref 33–136)
ALT SERPL W P-5'-P-CCNC: 9 U/L (ref 7–45)
ANION GAP SERPL CALC-SCNC: 14 MMOL/L (ref 10–20)
AST SERPL W P-5'-P-CCNC: 16 U/L (ref 9–39)
BASOPHILS # BLD MANUAL: 0.11 X10*3/UL (ref 0–0.1)
BASOPHILS NFR BLD MANUAL: 1 %
BILIRUB SERPL-MCNC: 0.4 MG/DL (ref 0–1.2)
BUN SERPL-MCNC: 22 MG/DL (ref 6–23)
CALCIUM SERPL-MCNC: 9.6 MG/DL (ref 8.6–10.6)
CHLORIDE SERPL-SCNC: 107 MMOL/L (ref 98–107)
CO2 SERPL-SCNC: 28 MMOL/L (ref 21–32)
CREAT SERPL-MCNC: 0.9 MG/DL (ref 0.5–1.05)
EGFRCR SERPLBLD CKD-EPI 2021: 62 ML/MIN/1.73M*2
EOSINOPHIL # BLD MANUAL: 0 X10*3/UL (ref 0–0.4)
EOSINOPHIL NFR BLD MANUAL: 0 %
ERYTHROCYTE [DISTWIDTH] IN BLOOD BY AUTOMATED COUNT: 15.5 % (ref 11.5–14.5)
GIANT PLATELETS BLD QL SMEAR: ABNORMAL
GLUCOSE SERPL-MCNC: 175 MG/DL (ref 74–99)
HBV CORE AB SER QL: REACTIVE
HBV SURFACE AG SERPL QL IA: NONREACTIVE
HCT VFR BLD AUTO: 35.7 % (ref 36–46)
HCV AB SER QL: NONREACTIVE
HGB BLD-MCNC: 11.3 G/DL (ref 12–16)
HIV 1+2 AB+HIV1 P24 AG SERPL QL IA: NONREACTIVE
IMM GRANULOCYTES # BLD AUTO: 0.01 X10*3/UL (ref 0–0.5)
IMM GRANULOCYTES NFR BLD AUTO: 0.1 % (ref 0–0.9)
LDH SERPL L TO P-CCNC: 142 U/L (ref 84–246)
LYMPHOCYTES # BLD MANUAL: 6.53 X10*3/UL (ref 0.8–3)
LYMPHOCYTES NFR BLD MANUAL: 61 %
MCH RBC QN AUTO: 29 PG (ref 26–34)
MCHC RBC AUTO-ENTMCNC: 31.7 G/DL (ref 32–36)
MCV RBC AUTO: 92 FL (ref 80–100)
MONOCYTES # BLD MANUAL: 0.43 X10*3/UL (ref 0.05–0.8)
MONOCYTES NFR BLD MANUAL: 4 %
NEUTS SEG # BLD MANUAL: 2.89 X10*3/UL (ref 1.6–5)
NEUTS SEG NFR BLD MANUAL: 27 %
NRBC BLD-RTO: ABNORMAL /100{WBCS}
OVALOCYTES BLD QL SMEAR: ABNORMAL
PLATELET # BLD AUTO: 208 X10*3/UL (ref 150–450)
POTASSIUM SERPL-SCNC: 4.7 MMOL/L (ref 3.5–5.3)
PROT SERPL-MCNC: 6.3 G/DL (ref 6.4–8.2)
RBC # BLD AUTO: 3.89 X10*6/UL (ref 4–5.2)
RBC MORPH BLD: ABNORMAL
SODIUM SERPL-SCNC: 144 MMOL/L (ref 136–145)
TOTAL CELLS COUNTED BLD: 100
VARIANT LYMPHS # BLD MANUAL: 0.75 X10*3/UL (ref 0–0.3)
VARIANT LYMPHS NFR BLD: 7 %
WBC # BLD AUTO: 10.7 X10*3/UL (ref 4.4–11.3)

## 2024-12-13 PROCEDURE — 86704 HEP B CORE ANTIBODY TOTAL: CPT

## 2024-12-13 PROCEDURE — 86803 HEPATITIS C AB TEST: CPT

## 2024-12-13 PROCEDURE — 80053 COMPREHEN METABOLIC PANEL: CPT

## 2024-12-13 PROCEDURE — 36415 COLL VENOUS BLD VENIPUNCTURE: CPT

## 2024-12-13 PROCEDURE — 85027 COMPLETE CBC AUTOMATED: CPT

## 2024-12-13 PROCEDURE — 85007 BL SMEAR W/DIFF WBC COUNT: CPT

## 2024-12-13 PROCEDURE — 87389 HIV-1 AG W/HIV-1&-2 AB AG IA: CPT

## 2024-12-13 PROCEDURE — 87340 HEPATITIS B SURFACE AG IA: CPT

## 2024-12-13 PROCEDURE — 83615 LACTATE (LD) (LDH) ENZYME: CPT

## 2024-12-17 DIAGNOSIS — B18.1 HEPATITIS B CARRIER (MULTI): Primary | ICD-10-CM

## 2024-12-18 ENCOUNTER — TELEPHONE (OUTPATIENT)
Dept: HEMATOLOGY/ONCOLOGY | Facility: CLINIC | Age: 89
End: 2024-12-18

## 2024-12-19 NOTE — PROGRESS NOTES
Phone message:  Spoke to patients son German [phone] who had many questions regarding his mothers diagnosis and scans and her upcoming treatment with rituxan.  He was very concerned about the potential side effects.  I explained that most people tolerate rituxan extremely well and most of the side effects would occur when the patient was in the infusion center.  I also explained that his mother has previously been exposed to hepatitis B and that she would need medications to prevent recurrence of this virus.   Patients son said his mother is doing fine now and they will discuss whether or not she wants to proceed with her treatment.  I asked him to please cancel her appointment on January 14th if they decide not to proceed.    Brigida will bring Emily to the lab to get needed hepatitis tests.

## 2024-12-26 ENCOUNTER — LAB (OUTPATIENT)
Dept: LAB | Facility: CLINIC | Age: 89
End: 2024-12-26
Payer: MEDICARE

## 2024-12-26 DIAGNOSIS — C82.01: ICD-10-CM

## 2024-12-26 DIAGNOSIS — B18.1 HEPATITIS B CARRIER (MULTI): ICD-10-CM

## 2024-12-26 LAB
HBV SURFACE AB SER-ACNC: >1000 MIU/ML
LDH SERPL L TO P-CCNC: 147 U/L (ref 84–246)

## 2024-12-26 PROCEDURE — 87517 HEPATITIS B DNA QUANT: CPT

## 2024-12-26 PROCEDURE — 86706 HEP B SURFACE ANTIBODY: CPT

## 2024-12-26 PROCEDURE — 83615 LACTATE (LD) (LDH) ENZYME: CPT

## 2024-12-26 PROCEDURE — 36415 COLL VENOUS BLD VENIPUNCTURE: CPT

## 2025-01-02 LAB
HBV DNA SERPL NAA+PROBE-ACNC: NOT DETECTED [IU]/ML
HBV DNA SERPL NAA+PROBE-LOG IU: NORMAL {LOG_IU}/ML

## 2025-01-03 DIAGNOSIS — I48.0 PAROXYSMAL ATRIAL FIBRILLATION (MULTI): ICD-10-CM

## 2025-01-03 RX ORDER — FLECAINIDE ACETATE 100 MG/1
100 TABLET ORAL EVERY 12 HOURS
Qty: 60 TABLET | Refills: 6 | Status: SHIPPED | OUTPATIENT
Start: 2025-01-03

## 2025-01-07 ENCOUNTER — TELEPHONE (OUTPATIENT)
Dept: HEMATOLOGY/ONCOLOGY | Facility: CLINIC | Age: 89
End: 2025-01-07
Payer: MEDICARE

## 2025-01-07 NOTE — TELEPHONE ENCOUNTER
Spoke with patient's son.  Patient's upcoming appointment with Dr. Meyer was cancelled as long as her first rituximab.  Per patient's son his mother does not wish to proceed with the rituximab at this time.  He states that they reviewed the information Dr. Meyer provided and discussed and the she does not want to experience the potential symptoms that rituximab can cause.  Will reach out to Brigida patient's  to schedule follow up with Dr. Meyer.

## 2025-01-14 ENCOUNTER — APPOINTMENT (OUTPATIENT)
Dept: HEMATOLOGY/ONCOLOGY | Facility: CLINIC | Age: 89
End: 2025-01-14
Payer: MEDICARE

## 2025-01-21 ENCOUNTER — APPOINTMENT (OUTPATIENT)
Dept: HEMATOLOGY/ONCOLOGY | Facility: CLINIC | Age: 89
End: 2025-01-21
Payer: MEDICARE

## 2025-01-21 ENCOUNTER — APPOINTMENT (OUTPATIENT)
Dept: DERMATOLOGY | Facility: CLINIC | Age: 89
End: 2025-01-21
Payer: MEDICARE

## 2025-01-28 ENCOUNTER — APPOINTMENT (OUTPATIENT)
Dept: HEMATOLOGY/ONCOLOGY | Facility: CLINIC | Age: 89
End: 2025-01-28
Payer: MEDICARE

## 2025-02-04 ENCOUNTER — APPOINTMENT (OUTPATIENT)
Dept: HEMATOLOGY/ONCOLOGY | Facility: CLINIC | Age: 89
End: 2025-02-04
Payer: MEDICARE

## 2025-02-07 ENCOUNTER — APPOINTMENT (OUTPATIENT)
Dept: PRIMARY CARE | Facility: CLINIC | Age: 89
End: 2025-02-07
Payer: MEDICARE

## 2025-02-10 ASSESSMENT — ENCOUNTER SYMPTOMS
HEMOPTYSIS: 0
LEG SWELLING: 1
NUMBNESS: 0
NAUSEA: 1
CHILLS: 0
FATIGUE: 1
ARTHRALGIAS: 0
DIARRHEA: 0
UNEXPECTED WEIGHT CHANGE: 0
BRUISES/BLEEDS EASILY: 0
VOMITING: 0
FEVER: 0
APPETITE CHANGE: 1
WHEEZING: 0
ADENOPATHY: 1

## 2025-02-11 ENCOUNTER — LAB (OUTPATIENT)
Dept: LAB | Facility: CLINIC | Age: 89
End: 2025-02-11
Payer: MEDICARE

## 2025-02-11 ENCOUNTER — OFFICE VISIT (OUTPATIENT)
Dept: HEMATOLOGY/ONCOLOGY | Facility: CLINIC | Age: 89
End: 2025-02-11
Payer: MEDICARE

## 2025-02-11 VITALS
BODY MASS INDEX: 28.09 KG/M2 | HEART RATE: 65 BPM | TEMPERATURE: 99 F | SYSTOLIC BLOOD PRESSURE: 146 MMHG | WEIGHT: 125.3 LBS | DIASTOLIC BLOOD PRESSURE: 78 MMHG | OXYGEN SATURATION: 97 % | RESPIRATION RATE: 18 BRPM

## 2025-02-11 DIAGNOSIS — C85.80 MARGINAL ZONE LYMPHOMA (MULTI): ICD-10-CM

## 2025-02-11 DIAGNOSIS — C85.80 MARGINAL ZONE LYMPHOMA (MULTI): Primary | ICD-10-CM

## 2025-02-11 LAB
ALBUMIN SERPL BCP-MCNC: 4.4 G/DL (ref 3.4–5)
ALP SERPL-CCNC: 57 U/L (ref 33–136)
ALT SERPL W P-5'-P-CCNC: 11 U/L (ref 7–45)
ANION GAP SERPL CALC-SCNC: 15 MMOL/L (ref 10–20)
AST SERPL W P-5'-P-CCNC: 18 U/L (ref 9–39)
BASOPHILS # BLD MANUAL: 0 X10*3/UL (ref 0–0.1)
BASOPHILS NFR BLD MANUAL: 0 %
BILIRUB SERPL-MCNC: 0.5 MG/DL (ref 0–1.2)
BUN SERPL-MCNC: 26 MG/DL (ref 6–23)
CALCIUM SERPL-MCNC: 9.7 MG/DL (ref 8.6–10.6)
CHLORIDE SERPL-SCNC: 106 MMOL/L (ref 98–107)
CO2 SERPL-SCNC: 25 MMOL/L (ref 21–32)
CREAT SERPL-MCNC: 0.86 MG/DL (ref 0.5–1.05)
EGFRCR SERPLBLD CKD-EPI 2021: 65 ML/MIN/1.73M*2
EOSINOPHIL # BLD MANUAL: 0 X10*3/UL (ref 0–0.4)
EOSINOPHIL NFR BLD MANUAL: 0 %
ERYTHROCYTE [DISTWIDTH] IN BLOOD BY AUTOMATED COUNT: 15.2 % (ref 11.5–14.5)
GLUCOSE SERPL-MCNC: 91 MG/DL (ref 74–99)
HBV CORE AB SER QL: REACTIVE
HBV SURFACE AB SER-ACNC: >1000 MIU/ML
HBV SURFACE AG SERPL QL IA: NONREACTIVE
HCT VFR BLD AUTO: 35.5 % (ref 36–46)
HGB BLD-MCNC: 11.4 G/DL (ref 12–16)
IMM GRANULOCYTES # BLD AUTO: 0.01 X10*3/UL (ref 0–0.5)
IMM GRANULOCYTES NFR BLD AUTO: 0.1 % (ref 0–0.9)
LDH SERPL L TO P-CCNC: 154 U/L (ref 84–246)
LYMPHOCYTES # BLD MANUAL: 13.07 X10*3/UL (ref 0.8–3)
LYMPHOCYTES NFR BLD MANUAL: 76 %
MCH RBC QN AUTO: 28.9 PG (ref 26–34)
MCHC RBC AUTO-ENTMCNC: 32.1 G/DL (ref 32–36)
MCV RBC AUTO: 90 FL (ref 80–100)
MONOCYTES # BLD MANUAL: 0.69 X10*3/UL (ref 0.05–0.8)
MONOCYTES NFR BLD MANUAL: 4 %
NEUTS SEG # BLD MANUAL: 2.06 X10*3/UL (ref 1.6–5)
NEUTS SEG NFR BLD MANUAL: 12 %
NRBC BLD-RTO: ABNORMAL /100{WBCS}
OVALOCYTES BLD QL SMEAR: ABNORMAL
PLATELET # BLD AUTO: 221 X10*3/UL (ref 150–450)
POTASSIUM SERPL-SCNC: 4.5 MMOL/L (ref 3.5–5.3)
PROT SERPL-MCNC: 6.7 G/DL (ref 6.4–8.2)
RBC # BLD AUTO: 3.94 X10*6/UL (ref 4–5.2)
RBC MORPH BLD: ABNORMAL
SODIUM SERPL-SCNC: 141 MMOL/L (ref 136–145)
TOTAL CELLS COUNTED BLD: 100
URATE SERPL-MCNC: 5.6 MG/DL (ref 2.3–6.7)
VARIANT LYMPHS # BLD MANUAL: 1.38 X10*3/UL (ref 0–0.3)
VARIANT LYMPHS NFR BLD: 8 %
WBC # BLD AUTO: 17.2 X10*3/UL (ref 4.4–11.3)

## 2025-02-11 PROCEDURE — 80053 COMPREHEN METABOLIC PANEL: CPT

## 2025-02-11 PROCEDURE — 1157F ADVNC CARE PLAN IN RCRD: CPT | Performed by: INTERNAL MEDICINE

## 2025-02-11 PROCEDURE — 86704 HEP B CORE ANTIBODY TOTAL: CPT

## 2025-02-11 PROCEDURE — 84550 ASSAY OF BLOOD/URIC ACID: CPT

## 2025-02-11 PROCEDURE — 86706 HEP B SURFACE ANTIBODY: CPT

## 2025-02-11 PROCEDURE — 1126F AMNT PAIN NOTED NONE PRSNT: CPT | Performed by: INTERNAL MEDICINE

## 2025-02-11 PROCEDURE — 85027 COMPLETE CBC AUTOMATED: CPT

## 2025-02-11 PROCEDURE — 3078F DIAST BP <80 MM HG: CPT | Performed by: INTERNAL MEDICINE

## 2025-02-11 PROCEDURE — 99214 OFFICE O/P EST MOD 30 MIN: CPT | Performed by: INTERNAL MEDICINE

## 2025-02-11 PROCEDURE — 85007 BL SMEAR W/DIFF WBC COUNT: CPT

## 2025-02-11 PROCEDURE — 87340 HEPATITIS B SURFACE AG IA: CPT

## 2025-02-11 PROCEDURE — 83615 LACTATE (LD) (LDH) ENZYME: CPT

## 2025-02-11 PROCEDURE — 3077F SYST BP >= 140 MM HG: CPT | Performed by: INTERNAL MEDICINE

## 2025-02-11 PROCEDURE — 1159F MED LIST DOCD IN RCRD: CPT | Performed by: INTERNAL MEDICINE

## 2025-02-11 PROCEDURE — 36415 COLL VENOUS BLD VENIPUNCTURE: CPT

## 2025-02-11 ASSESSMENT — PAIN SCALES - GENERAL: PAINLEVEL_OUTOF10: 0-NO PAIN

## 2025-02-11 NOTE — PROGRESS NOTES
Patient ID: Sylvia Martinez is a 89 y.o. female.    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      Patient had a followup CT scan of neck, chest, abdomen and pelvis  on 11/1/21 which shows that the majoriy of laila disease is stable to smaller. The largest node measures 3.2 cm in the short axis as described above. slowly increasing nodes on imaging  6/2022 8/22/23  Stable weight from last visit.  Multiple 2-3 cm cervical notes and 5x5 cm left inguinal nodes increased adenpathy by exam but assymptomatic,   Have ordered PET scan and plan laila biopsy as patient has dual dx of CLL and FCC to determine most appropratie treatment.     PET scan showed diffuse lymphadenopathy with SUV 4.5 to 4.8  with left pelvic side wall mass SUV of 4.5    Restaging evaluation November 2023    CT scan in ER 10/14/23   1.  Rectal and sigmoid colon wall thickening concerning for   proctitis/colitis.   2. There are a few enlarged lymph nodes noted in the left para-aortic   region, left common iliac chain and left external iliac chain.   3.  Nonspecific splenic hypodense lesion.   Additional database sent 11/28/23 on peripheral blood:  Analysis of peripheral blood 11/28/28:  Immunophenotypic findings consistent with CD5 positive B cell lymphoproliferative disorder, A CD5-negative B cell clone was not observed in this analysis. The phenotype is atypical for chronic lymphocytic leukemia. The differential diagnosis by phenotype includes mantle cell lymphoma and a CD5+ lymphoplasmacytic or marginal zone lymphoma. Previous tissue biopsy studies did not identify expression of Cyclin D1 or SOX11 suggesting mantle cell lymphoma was unlikely.    Focused Lymphoma NGS Results    DISEASE ASSOCIATED GENOMIC FINDINGS:   BIRC3 p.L854Kju*3 ([id number] c.[id number])  BIRC3 p.P325Fmq*34 ([id number] c.[id number])  NFKBIE p.X865Kmi*13 ([id number] c.[id number])        Follicular lymphoma   4/8/2023 Initial Diagnosis    Follicular lymphoma (CMS/HCC)     Marginal zone lymphoma (Multi)   12/10/2024 Initial Diagnosis    Marginal zone lymphoma (Multi)     1/14/2025 -  Chemotherapy    RiTUXimab (Weekly), 28 Day Cycle          Response:     Past Medical History:     Past Medical History:   Diagnosis Date    Abnormal finding of blood chemistry, unspecified     Abnormal blood chemistry    Bladder disorder, unspecified 08/16/2016    Bladder disorder    Dyspnea, unspecified 10/11/2016    Dyspnea    Other cervical disc degeneration, unspecified cervical region 10/11/2016    Degenerative cervical disc    Other specified anxiety disorders 05/01/2019    Depression with anxiety    Other specified diseases of intestine 06/25/2017    Bacterial overgrowth syndrome    Pain in unspecified joint 07/12/2016    Joint pain    Pain in unspecified joint     Arthralgia    Palpitations 06/01/2016    Palpitations    Personal history of diseases of the blood and blood-forming organs and certain disorders involving the immune mechanism     History of anemia    Personal history of other diseases of the circulatory system 05/02/2017    H/O atrial dilatation    Personal history of other diseases of the circulatory system  10/11/2016    History of hypertension    Personal history of other diseases of the digestive system 05/02/2017    History of acute pancreatitis    Personal history of other diseases of the digestive system 07/12/2016    History of diverticulitis of colon    Personal history of other diseases of the digestive system 10/11/2016    History of hemorrhoids    Personal history of other diseases of the digestive system     History of gastroesophageal reflux (GERD)    Personal history of other diseases of the digestive system 11/08/2016    History of acute pancreatitis    Personal history of other diseases of the digestive system 10/11/2016    History of hiatal hernia    Personal history of other diseases of the musculoskeletal system and connective tissue     History of low back pain    Personal history of other diseases of the musculoskeletal system and connective tissue     History of osteoarthritis    Personal history of other diseases of the musculoskeletal system and connective tissue     History of osteopenia    Personal history of other diseases of the respiratory system 05/02/2017    History of chronic obstructive lung disease    Personal history of other diseases of the respiratory system 07/12/2016    H/O acute bronchitis    Personal history of other endocrine, nutritional and metabolic disease 05/02/2017    History of diabetes mellitus    Personal history of other endocrine, nutritional and metabolic disease 10/11/2016    History of hypothyroidism    Personal history of other endocrine, nutritional and metabolic disease     History of thyroid disease    Personal history of other endocrine, nutritional and metabolic disease 07/12/2016    History of hyperlipidemia    Personal history of other endocrine, nutritional and metabolic disease 10/11/2016    History of hyperlipidemia    Personal history of other mental and behavioral disorders 07/12/2016    History of depression    Personal history of other mental and  behavioral disorders 10/11/2016    History of depression    Personal history of other specified conditions 10/11/2016    History of headache    Personal history of other specified conditions     Personal history of multiple pulmonary nodules    Personal history of other specified conditions 10/11/2016    History of shortness of breath    Personal history of other specified conditions     History of vertigo    Postnasal drip 10/24/2014    Postnasal drip    Presence of external hearing-aid     Wears hearing aid    Unspecified abdominal pain 08/08/2022    Abdominal pain       Surgical History:     Past Surgical History:   Procedure Laterality Date    CHOLECYSTECTOMY  05/02/2017    Cholecystectomy Laparoscopic    COLONOSCOPY  01/30/2014    Colonoscopy (Fiberoptic)    ESOPHAGOGASTRODUODENOSCOPY  10/11/2016    Diagnostic Esophagogastroduodenoscopy    GALLBLADDER SURGERY  10/03/2013    Gallbladder Surgery    HERNIA REPAIR  10/11/2016    Hernia Repair    HYSTERECTOMY  05/02/2017    Hysterectomy    OTHER SURGICAL HISTORY  01/02/2022    Loop recorder insertion    OTHER SURGICAL HISTORY  11/08/2016    Anterior Gastropexy For Hiatal Hernia    OTHER SURGICAL HISTORY  01/30/2014    Stress Test ECG Performed        Family History:     Family History   Problem Relation Name Age of Onset    Stroke Mother      Diabetes Other FAMILY HISTORY         TYPE 2    Hypertension Other FAMILY HISTORY         ESSENTIAL, BENIGN    Coronary artery disease Other FAMILY HISTORY        Social History:  Cuban speaking.  Has  (Brigida) who comes to visits with her.  Okay to call Brigida regarding care.    Social History     Tobacco Use    Smoking status: Never     Passive exposure: Never    Smokeless tobacco: Never   Vaping Use    Vaping status: Never Used   Substance Use Topics    Alcohol use: Never    Drug use: Never      -------------------------------------------------------------------------------------------------------  Subjective        HPI    Sylvia Martinez is a 89 yo woman with low grade b cell lymphoma, likely marginal zone lymphoma as above      CT scan in ER 10/14/23     1.  Rectal and sigmoid colon wall thickening concerning for   proctitis/colitis.   2. There are a few enlarged lymph nodes noted in the left para-aortic   region, left common iliac chain and left external iliac chain.   3.  Nonspecific splenic hypodense lesion.   Additional database sent 11/28/23 on peripheral blood:     Peripheral blood flow 11/2023: Immunophenotypic findings consistent with CD5 positive B cell lymphoproliferative disorder, see note:   Note: A CD5-negative B cell clone was not observed in this analysis. The phenotype is atypical for chronic lymphocytic leukemia. The differential diagnosis by phenotype includes mantle cell lymphoma and a CD5+ lymphoplasmacytic or marginal zone lymphoma. Previous tissue biopsy studies did not identify expression of Cyclin D1 or SOX11 suggesting mantle cell lymphoma was unlikely.    Focused Lymphoma NGS Results    DISEASE ASSOCIATED GENOMIC FINDINGS:   BIRC3 p.B897Gmm*3 ([id number] c.[id number])  BIRC3 p.G992Qfr*34 ([id number] c.[id number])  NFKBIE p.G450Ghg*13 ([id number] c.[id number])     Sylvia  last seen in this clinic 12/10/24 with her  Brigida for follow up evaluation and count check and to discuss treatment options for progressive marginal zone lymphoma.   Last visit patient complained of stiffness in her nose and since this was one of the original sites of her lymphoma a CT of sinuses 12/2/24 showed increase in size of mass midline nasopharynx from 1.8 to 2.4 cm and slight increase in other neck nodes. Similarly CT scan of chest, abdomen and pelvis showed increased lymphadenopathy in multiple laila areas.  At that time we discussed starting rituxan.       Patient here today 2/11/25  with her  for ongoing followup of marginal zone lymphoma.  The patient and her son have decided  not to pursue rituxan treatment after reading patient information materials. Further patient was discovered to be Hep B core antibody positive, although negative by pcr.. Patient denies any problems breathing or swallowing.  She complains of ongoing bloating after remote stomach surgery.  Denies any difficult with breathing where she has known sinus involvement. Unfortunately has to live in a hotel while apartment is being renovated. Continues to lose weight due to poor appetite, only eats two modest meals a day      Review of Systems   Constitutional:  Positive for appetite change and fatigue. Negative for chills, diaphoresis, fever and unexpected weight change.   Respiratory:  Negative for cough, hemoptysis, shortness of breath and wheezing.    Cardiovascular:  Positive for leg swelling.   Gastrointestinal:  Positive for nausea. Negative for constipation, diarrhea and vomiting.   Genitourinary: Negative.     Musculoskeletal:  Positive for gait problem. Negative for arthralgias.   Skin: Negative.    Neurological:  Positive for gait problem. Negative for numbness.   Hematological:  Positive for adenopathy. Does not bruise/bleed easily.   All other systems reviewed and are negative.  -------------------------------------------------------------------------------------------------------  Objective   BSA: 1.5 meters squared  /78   Pulse 65   Temp 37.2 °C (99 °F)   Resp 18   Wt 56.8 kg (125 lb 4.8 oz)   SpO2 97%   BMI 28.09 kg/m²     Physical Exam  Constitutional:       Appearance: Normal appearance.      Comments: Petite, alert and conversant, Estonian speaking   HENT:      Head: Normocephalic and atraumatic.      Nose: Nose normal.      Comments: No visible masses to bilateral nostrils.  Eyes:      Extraocular Movements: Extraocular movements intact.      Conjunctiva/sclera: Conjunctivae normal.      Pupils: Pupils are equal, round, and reactive to light.   Cardiovascular:      Rate and Rhythm: Normal rate  and regular rhythm.      Heart sounds: Murmur (2/6 RUMA) heard.   Pulmonary:      Breath sounds: Normal breath sounds.   Abdominal:      General: Abdomen is flat. Bowel sounds are normal.      Palpations: Abdomen is soft.   Musculoskeletal:         General: Normal range of motion.      Right lower le+ Pitting Edema present.      Left lower le+ Pitting Edema present.   Lymphadenopathy:      Cervical: Cervical adenopathy present.      Comments: Bilateral cervical nodes anterior and posterior about 1.5 cm in size.  Fullness left inguinal nahum about 5x4 cm   Skin:     General: Skin is warm and dry.   Neurological:      General: No focal deficit present.      Mental Status: She is oriented to person, place, and time.   Psychiatric:         Mood and Affect: Mood normal.         Behavior: Behavior normal.         Thought Content: Thought content normal.     Performance Status:  Symptomatic; fully ambulatory  -------------------------------------------------------------------------------------------------------  Assessment/Plan      87 yo Patient with reported dual  diagnosis of CLL and FCC , however recent flow actually suggests marginal zone ( see above) which is more consistent with her clinical course of indolent disease and circulating lymphocytosis.  CT imaging and clinically stable and weight is better so will continue watchful waiting.  If treatment needed would favor single agent rituxan, although a BTK inhibitor would also be appropriate.     Overall patient is doing fine, continue observation.  Will repeat CT imaging based on clinical findings.    12/10/24:  Followup of marginal zone lymphoma with increase in nasopharyngeal mass which was visible on exam.  Sylvia has history of nasal pharyngeal biopsy on 19 with findings of low grade CD5 positive, CD10 negative B Cell lymphoma. CT imaging shows ongoing increase in diffuse lymphadenopathy.  I have no concerns for transformed disease.  I have  recommended single agent rituxan will start with 4 weekly course.  I reviewed side effects and schedule of rituxan in detail with the patient and her  and provided written information in english and Maldivian. Side effects including increased risk of infection and allergic reactions discussed.  Patient signed short chemo consent and tentatively schedule dose 1 on  Tuesday January 14.  I encouraged patient to have her son accompany her to her first treatment as her  Brigida cannot stay with her all day. Patient was unable to stay for lab work today Found to be hep B core antibody positive.    2/11/25 Followup after patient decided not to pursue rituxan therapy .  Generally stable with some increase in circulating lymphoma cells.  Ongoing weight loss clearly related to insufficient caloric intake,  no interested in trying dietary supplements or supplementing with snacks between meals.   feels patient may be depressed. Discussed starting remeron but would like this to be managed by her psychiatrist due to potential drug interactions with her other antipsychotics. If disease progresses could offer BTK inhibitors.  Would favor starting entacavir if treatment offered    WBC   Date Value Ref Range Status   02/11/2025 17.2 (H) 4.4 - 11.3 x10*3/uL Final   12/13/2024 10.7 4.4 - 11.3 x10*3/uL Final   11/12/2024 16.0 (H) 4.4 - 11.3 x10*3/uL Final   02/20/2024 16.8 (H) 4.4 - 11.3 x10*3/uL Final   11/28/2023 9.0 4.4 - 11.3 x10*3/uL Final     Platelets   Date Value Ref Range Status   02/11/2025 221 150 - 450 x10*3/uL Final   12/13/2024 208 150 - 450 x10*3/uL Final   11/12/2024 215 150 - 450 x10*3/uL Final   02/20/2024 234 150 - 450 x10*3/uL Final   11/28/2023 178 150 - 450 x10*3/uL Final     Hemoglobin   Date Value Ref Range Status   02/11/2025 11.4 (L) 12.0 - 16.0 g/dL Final   12/13/2024 11.3 (L) 12.0 - 16.0 g/dL Final   11/12/2024 11.3 (L) 12.0 - 16.0 g/dL Final   02/20/2024 11.2 (L) 12.0 -  16.0 g/dL Final   11/28/2023 11.0 (L) 12.0 - 16.0 g/dL Final     Healthcare Maintenance:  Received pneumococcal vaccine 5/2/24.  Advised to receive influenza, updated covid, RSV, and shingles vaccines at local pharmacy.     RTC: 4 months with labs    -------------------------------------------------------------------------------------------------------  Liyah Meyer MD

## 2025-02-12 ASSESSMENT — ENCOUNTER SYMPTOMS
SHORTNESS OF BREATH: 0
CONSTIPATION: 0
DIAPHORESIS: 0
COUGH: 0

## 2025-02-14 ENCOUNTER — APPOINTMENT (OUTPATIENT)
Dept: PRIMARY CARE | Facility: CLINIC | Age: 89
End: 2025-02-14
Payer: MEDICARE

## 2025-02-14 DIAGNOSIS — F32.A DEPRESSION, UNSPECIFIED DEPRESSION TYPE: Primary | ICD-10-CM

## 2025-02-14 DIAGNOSIS — E11.69 TYPE 2 DIABETES MELLITUS WITH OTHER SPECIFIED COMPLICATION, WITHOUT LONG-TERM CURRENT USE OF INSULIN: ICD-10-CM

## 2025-02-14 DIAGNOSIS — I48.91 ATRIAL FIBRILLATION, UNSPECIFIED TYPE (MULTI): ICD-10-CM

## 2025-02-14 DIAGNOSIS — K86.1 OTHER CHRONIC PANCREATITIS: ICD-10-CM

## 2025-02-14 DIAGNOSIS — F33.2 MAJOR DEPRESSIVE DISORDER, RECURRENT SEVERE WITHOUT PSYCHOTIC FEATURES (MULTI): ICD-10-CM

## 2025-02-14 PROCEDURE — 1160F RVW MEDS BY RX/DR IN RCRD: CPT | Performed by: INTERNAL MEDICINE

## 2025-02-14 PROCEDURE — 99212 OFFICE O/P EST SF 10 MIN: CPT | Performed by: INTERNAL MEDICINE

## 2025-02-14 PROCEDURE — 1157F ADVNC CARE PLAN IN RCRD: CPT | Performed by: INTERNAL MEDICINE

## 2025-02-14 PROCEDURE — 1159F MED LIST DOCD IN RCRD: CPT | Performed by: INTERNAL MEDICINE

## 2025-02-14 PROCEDURE — 1036F TOBACCO NON-USER: CPT | Performed by: INTERNAL MEDICINE

## 2025-02-14 PROCEDURE — 3078F DIAST BP <80 MM HG: CPT | Performed by: INTERNAL MEDICINE

## 2025-02-14 PROCEDURE — G2211 COMPLEX E/M VISIT ADD ON: HCPCS | Performed by: INTERNAL MEDICINE

## 2025-02-14 PROCEDURE — 3074F SYST BP LT 130 MM HG: CPT | Performed by: INTERNAL MEDICINE

## 2025-02-14 RX ORDER — MIRTAZAPINE 7.5 MG/1
7.5 TABLET, FILM COATED ORAL NIGHTLY
Qty: 30 TABLET | Refills: 2 | Status: SHIPPED | OUTPATIENT
Start: 2025-02-14 | End: 2025-05-15

## 2025-02-14 NOTE — PROGRESS NOTES
Subjective   Patient ID: Sylvia Martinez is a 89 y.o. female who presents for follow-up.  Patient is depressed.  Followed by the oncology, declined to have any further treatment.  Has abdominal discomfort, bloating.    HPI     This is an 89 years old Central African speaking lady with medical history significant for abdominal pain, gastroesophageal reflux disease, chronic bloating, discomfort, constipation, history of fundoplasty, varicosities, severe low back pain, lumbosacral stenosis, arthralgia, hypothyroidism, diabetes, hyper lipidemia, vitamin D. deficiency, depression, anxiety, headaches.,s/p EGD, colonoscopy 8/13/2018 with small cecal polyp, diverticulosis, gastropathy, s/p Hospitalization 5/14/2021 for arrhythmia, s/p admission to [facility] 4/7/2022 to 4/8/2022 with atrial fibrillation, s/p L cataract surgery 6/7/2022, COVID -19 8/26/2022, s/p ED visit 10/14/2023 for colitis, had Rectal and sigmoid wall thickening , concerning for proctitis, colitis.     Patient is  presented  for follow up.  She is in the room with her  present.  Depressed, having insomnia.     Has low energy, fatigue.  Followed by oncology Dr. Meyer for CLL, follicular lymphoma, seen  2/11/2025, advised to start on therapy, patient had declined for now.  Depressed, followed by Dr Washington.      Blood pressure is stable, better controlled.  Has occasional constipations.  Weak at the time.  Stable.  Has bloating, abdominal discomfort.  Has malaise, fatigue.  Taking Miralax, could not tolerate Linzess.      Followed by  GI  Dr Pickard.     Denies to have any chest pain, shortness of breath, no fever or chills.  Has no headaches.     Followed by Dr. Galo.     Stated, that followed by the urology.  Blood glucose is stable, controlled by diet.     Review of Systems   Constitutional:  Positive for activity change and fatigue.   Gastrointestinal:  Positive for abdominal pain. Negative for abdominal distention.   Musculoskeletal:  Positive  "for back pain.   Psychiatric/Behavioral:  Positive for decreased concentration, dysphoric mood and sleep disturbance. The patient is nervous/anxious.        Objective   /62   Pulse 64   Temp 36.8 °C (98.2 °F) (Temporal)   Resp 16   Ht 1.422 m (4' 8\")   Wt 56.2 kg (124 lb)   BMI 27.80 kg/m²     Physical Exam  Constitutional:       Appearance: Normal appearance.   HENT:      Nose: Nose normal.   Eyes:      Extraocular Movements: Extraocular movements intact.      Pupils: Pupils are equal, round, and reactive to light.   Cardiovascular:      Rate and Rhythm: Regular rhythm.      Heart sounds: Normal heart sounds.   Abdominal:      Palpations: Abdomen is soft.   Musculoskeletal:         General: Normal range of motion.      Cervical back: Normal range of motion.   Neurological:      Mental Status: She is alert. Mental status is at baseline.   Psychiatric:         Behavior: Behavior normal.         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  Follow-up with psychiatry Dr Washington.  Patient was not taking any medications.  Start on mirtazapine.     - Asthma.  COPD.  Pro-air as directed.  Use Trelegy, samples are given.  CLL/ Grade 1 follicular lymphoma.  -Nasopharyngeal lesion/ Neck mass, Mostly, due to Follicular Lymphoma.  S/p Bx by Dr Booker 7/22/2019.  Followed  by  Dr Meyer, had appointment 2/11/2025.  Patient was advised to start on therapy, declined for now.    -Bacterial overgrowth syndrome.  Abdominal " discomfort.  Bloating.   IBS.  Continue to use  Dicyclomine.  Strict diet.  Follow-up with GI.      -Atrial fibrillation episodes.  Palpitations.  Followed by Dr Galo,  EP cardiology 3/22/82944.  Return in 6 month.  Take Flecainide  as directed.    - Hypertension.  Well-controlled.  Current therapy.  Taking Amlodipine 2.5 mg daily.  Exercise, avoid salt.     S/p ED visit 10/14/2023 for proctitis, colitis .  Evaluated by  ED of [facility], CT scan done.  Started on Flagyl and Keflex, was not able to tolerate Flagyl due to nausea.  Has incoming GI appointment with GI DR Pickard.     as directed.      S/p Right eye surgery cataract 1/17/2023.   Good results.     - H of  COVID -19 illness 8/26/2022.  Has still cough, some shortness of breath.  Extremely weak.   Could not tolerate medications.  Chest X ray.     -Overactive Bladder.  Stable.  Follow up with urology.     -Diabetes type II.  Accu-Cheks, take medications as directed.  Diet, exercise.  Off medications.     - Health maintenance.  Medicare Wellness Annual Exam is  up to date.  Colonoscopy screening is not indicated.  Declined to see GYN.     Hearing loss.  Has bilateral hearing aids.     - Overweight with BMI  27.80.  Lost > 4 Lbs  Diet, exercise.  Keep ideal BMI is less than 25.

## 2025-02-16 VITALS
HEART RATE: 64 BPM | SYSTOLIC BLOOD PRESSURE: 122 MMHG | WEIGHT: 124 LBS | BODY MASS INDEX: 27.9 KG/M2 | DIASTOLIC BLOOD PRESSURE: 62 MMHG | HEIGHT: 56 IN | TEMPERATURE: 98.2 F | RESPIRATION RATE: 16 BRPM

## 2025-02-16 PROBLEM — E11.69 TYPE 2 DIABETES MELLITUS WITH OTHER SPECIFIED COMPLICATION, WITHOUT LONG-TERM CURRENT USE OF INSULIN: Status: ACTIVE | Noted: 2023-04-08

## 2025-02-16 ASSESSMENT — ENCOUNTER SYMPTOMS
DYSPHORIC MOOD: 1
BACK PAIN: 1
ABDOMINAL DISTENTION: 0
DECREASED CONCENTRATION: 1
ABDOMINAL PAIN: 1
NERVOUS/ANXIOUS: 1
FATIGUE: 1
SLEEP DISTURBANCE: 1
ACTIVITY CHANGE: 1

## 2025-03-13 ENCOUNTER — APPOINTMENT (OUTPATIENT)
Dept: OTOLARYNGOLOGY | Facility: CLINIC | Age: 89
End: 2025-03-13
Payer: MEDICARE

## 2025-03-17 DIAGNOSIS — F32.A DEPRESSION, UNSPECIFIED DEPRESSION TYPE: ICD-10-CM

## 2025-03-17 RX ORDER — MIRTAZAPINE 7.5 MG/1
7.5 TABLET, FILM COATED ORAL NIGHTLY
Qty: 90 TABLET | Refills: 3 | Status: SHIPPED | OUTPATIENT
Start: 2025-03-17

## 2025-03-22 PROCEDURE — G0179 MD RECERTIFICATION HHA PT: HCPCS | Performed by: INTERNAL MEDICINE

## 2025-04-10 DIAGNOSIS — I48.0 PAROXYSMAL ATRIAL FIBRILLATION (MULTI): ICD-10-CM

## 2025-05-01 DIAGNOSIS — T78.40XS ALLERGIC REACTION, SEQUELA: ICD-10-CM

## 2025-05-01 RX ORDER — LORATADINE 10 MG/1
10 TABLET ORAL DAILY
Qty: 30 TABLET | Refills: 0 | Status: SHIPPED | OUTPATIENT
Start: 2025-05-01

## 2025-05-14 ENCOUNTER — APPOINTMENT (OUTPATIENT)
Dept: PRIMARY CARE | Facility: CLINIC | Age: 89
End: 2025-05-14
Payer: MEDICARE

## 2025-05-15 NOTE — TELEPHONE ENCOUNTER
D/w patient son about patient condition, currently at .  Has rectal pain, will see colorectal surgery.

## 2025-05-30 ENCOUNTER — APPOINTMENT (OUTPATIENT)
Dept: PRIMARY CARE | Facility: CLINIC | Age: 89
End: 2025-05-30
Payer: MEDICARE

## 2025-06-10 ENCOUNTER — TELEPHONE (OUTPATIENT)
Dept: PRIMARY CARE | Facility: CLINIC | Age: 89
End: 2025-06-10
Payer: MEDICARE

## 2025-06-10 ENCOUNTER — APPOINTMENT (OUTPATIENT)
Dept: HEMATOLOGY/ONCOLOGY | Facility: CLINIC | Age: 89
End: 2025-06-10
Payer: MEDICARE

## 2025-06-10 NOTE — TELEPHONE ENCOUNTER
Tray would like to know if you could put in Rx for all the required incontinence supplies for Sylvia for when she leaves rehab. Once she leave rehab she will stay with tray, so he want the supplies to come to his house. He send it to the normal place you always send to..     I added his address as a temporary location for her.

## 2025-07-01 PROCEDURE — G0180 MD CERTIFICATION HHA PATIENT: HCPCS | Performed by: INTERNAL MEDICINE

## 2025-07-09 ENCOUNTER — OFFICE VISIT (OUTPATIENT)
Dept: PRIMARY CARE | Facility: CLINIC | Age: 89
End: 2025-07-09
Payer: MEDICARE

## 2025-07-09 VITALS
RESPIRATION RATE: 16 BRPM | SYSTOLIC BLOOD PRESSURE: 118 MMHG | BODY MASS INDEX: 29.15 KG/M2 | WEIGHT: 130 LBS | HEART RATE: 62 BPM | TEMPERATURE: 99.3 F | DIASTOLIC BLOOD PRESSURE: 62 MMHG

## 2025-07-09 DIAGNOSIS — I48.0 PAROXYSMAL ATRIAL FIBRILLATION (MULTI): ICD-10-CM

## 2025-07-09 DIAGNOSIS — I69.359 UNILATERAL PARALYSIS AS LATE EFFECT OF CEREBROVASCULAR ACCIDENT (CVA) (MULTI): ICD-10-CM

## 2025-07-09 DIAGNOSIS — E55.9 VITAMIN D DEFICIENCY: ICD-10-CM

## 2025-07-09 DIAGNOSIS — I63.411 CEREBROVASCULAR ACCIDENT (CVA) DUE TO EMBOLISM OF RIGHT MIDDLE CEREBRAL ARTERY (MULTI): ICD-10-CM

## 2025-07-09 DIAGNOSIS — E53.8 VITAMIN B12 DEFICIENCY: ICD-10-CM

## 2025-07-09 DIAGNOSIS — E03.9 HYPOTHYROIDISM, UNSPECIFIED TYPE: ICD-10-CM

## 2025-07-09 DIAGNOSIS — E11.69 TYPE 2 DIABETES MELLITUS WITH OTHER SPECIFIED COMPLICATION, WITHOUT LONG-TERM CURRENT USE OF INSULIN: ICD-10-CM

## 2025-07-09 DIAGNOSIS — E78.2 HYPERLIPIDEMIA, MIXED: ICD-10-CM

## 2025-07-09 DIAGNOSIS — G47.00 INSOMNIA, UNSPECIFIED TYPE: ICD-10-CM

## 2025-07-09 DIAGNOSIS — Z00.00 ROUTINE GENERAL MEDICAL EXAMINATION AT HEALTH CARE FACILITY: ICD-10-CM

## 2025-07-09 DIAGNOSIS — B18.1 CHRONIC VIRAL HEPATITIS B WITHOUT DELTA AGENT AND WITHOUT COMA (MULTI): ICD-10-CM

## 2025-07-09 DIAGNOSIS — D50.9 IRON DEFICIENCY ANEMIA, UNSPECIFIED IRON DEFICIENCY ANEMIA TYPE: Primary | ICD-10-CM

## 2025-07-09 DIAGNOSIS — F32.A DEPRESSION, UNSPECIFIED DEPRESSION TYPE: ICD-10-CM

## 2025-07-09 PROCEDURE — 1160F RVW MEDS BY RX/DR IN RCRD: CPT | Performed by: INTERNAL MEDICINE

## 2025-07-09 PROCEDURE — 1170F FXNL STATUS ASSESSED: CPT | Performed by: INTERNAL MEDICINE

## 2025-07-09 PROCEDURE — 1123F ACP DISCUSS/DSCN MKR DOCD: CPT | Performed by: INTERNAL MEDICINE

## 2025-07-09 PROCEDURE — 1036F TOBACCO NON-USER: CPT | Performed by: INTERNAL MEDICINE

## 2025-07-09 PROCEDURE — 82306 VITAMIN D 25 HYDROXY: CPT | Performed by: INTERNAL MEDICINE

## 2025-07-09 PROCEDURE — 83721 ASSAY OF BLOOD LIPOPROTEIN: CPT | Performed by: INTERNAL MEDICINE

## 2025-07-09 PROCEDURE — 3078F DIAST BP <80 MM HG: CPT | Performed by: INTERNAL MEDICINE

## 2025-07-09 PROCEDURE — 3074F SYST BP LT 130 MM HG: CPT | Performed by: INTERNAL MEDICINE

## 2025-07-09 PROCEDURE — 1158F ADVNC CARE PLAN TLK DOCD: CPT | Performed by: INTERNAL MEDICINE

## 2025-07-09 PROCEDURE — 99215 OFFICE O/P EST HI 40 MIN: CPT | Performed by: INTERNAL MEDICINE

## 2025-07-09 PROCEDURE — G0439 PPPS, SUBSEQ VISIT: HCPCS | Performed by: INTERNAL MEDICINE

## 2025-07-09 PROCEDURE — 1159F MED LIST DOCD IN RCRD: CPT | Performed by: INTERNAL MEDICINE

## 2025-07-09 PROCEDURE — 85025 COMPLETE CBC W/AUTO DIFF WBC: CPT | Performed by: INTERNAL MEDICINE

## 2025-07-09 PROCEDURE — 83036 HEMOGLOBIN GLYCOSYLATED A1C: CPT | Performed by: INTERNAL MEDICINE

## 2025-07-09 RX ORDER — CALCIUM CARBONATE 200(500)MG
500 TABLET,CHEWABLE ORAL 3 TIMES DAILY PRN
COMMUNITY
Start: 2025-05-18

## 2025-07-09 RX ORDER — ASPIRIN 81 MG/1
81 TABLET ORAL DAILY
Qty: 90 TABLET | Refills: 2 | Status: SHIPPED | OUTPATIENT
Start: 2025-07-09 | End: 2025-10-07

## 2025-07-09 RX ORDER — PANTOPRAZOLE SODIUM 40 MG/1
TABLET, DELAYED RELEASE ORAL EVERY 24 HOURS
COMMUNITY
Start: 2025-05-21 | End: 2025-07-11 | Stop reason: SDUPTHER

## 2025-07-09 RX ORDER — SENNOSIDES 8.6 MG/1
8.6 TABLET ORAL
COMMUNITY
Start: 2025-05-18

## 2025-07-09 RX ORDER — POLYETHYLENE GLYCOL 3350 17 G/17G
17 POWDER, FOR SOLUTION ORAL
COMMUNITY
Start: 2025-05-19 | End: 2025-07-11 | Stop reason: SDUPTHER

## 2025-07-09 RX ORDER — DICLOFENAC SODIUM 10 MG/G
2 GEL TOPICAL 3 TIMES DAILY
COMMUNITY
Start: 2025-05-18

## 2025-07-09 RX ORDER — FOLIC ACID 1 MG/1
TABLET ORAL
COMMUNITY
Start: 2025-06-27

## 2025-07-09 RX ORDER — TALC
POWDER (GRAM) TOPICAL
COMMUNITY
Start: 2025-05-18

## 2025-07-09 RX ORDER — MIRTAZAPINE 15 MG/1
15 TABLET, FILM COATED ORAL NIGHTLY
Qty: 30 TABLET | Refills: 2 | Status: SHIPPED | OUTPATIENT
Start: 2025-07-09 | End: 2025-10-07

## 2025-07-09 RX ORDER — BISACODYL 10 MG/1
10 SUPPOSITORY RECTAL DAILY PRN
COMMUNITY
Start: 2025-05-18

## 2025-07-09 ASSESSMENT — ACTIVITIES OF DAILY LIVING (ADL)
USING TELEPHONE: TOTAL CARE
EATING: NEEDS ASSISTANCE
BATHING: DEPENDENT
HEARING - LEFT EAR: HEARING AID
TAKING MEDICATION: TOTAL CARE
DRESSING YOURSELF: DEPENDENT
GROOMING: DEPENDENT
NEEDS ASSISTANCE WITH FOOD: SET UP OF PLATE
MANAGING FINANCES: TOTAL CARE
GROCERY SHOPPING: TOTAL CARE
FEEDING YOURSELF: DEPENDENT
WALKS IN HOME: DEPENDENT
HEARING - RIGHT EAR: HEARING AID
USING TRANSPORTATION: TOTAL CARE
STIL DRIVING: NO
ADEQUATE_TO_COMPLETE_ADL: YES
DOING HOUSEWORK: TOTAL CARE
PREPARING MEALS: TOTAL CARE
PILL BOX USED: YES
TOILETING: DEPENDENT
PATIENT'S MEMORY ADEQUATE TO SAFELY COMPLETE DAILY ACTIVITIES?: YES
JUDGMENT_ADEQUATE_SAFELY_COMPLETE_DAILY_ACTIVITIES: YES

## 2025-07-09 ASSESSMENT — ANXIETY QUESTIONNAIRES
4. TROUBLE RELAXING: SEVERAL DAYS
1. FEELING NERVOUS, ANXIOUS, OR ON EDGE: SEVERAL DAYS
3. WORRYING TOO MUCH ABOUT DIFFERENT THINGS: SEVERAL DAYS
IF YOU CHECKED OFF ANY PROBLEMS ON THIS QUESTIONNAIRE, HOW DIFFICULT HAVE THESE PROBLEMS MADE IT FOR YOU TO DO YOUR WORK, TAKE CARE OF THINGS AT HOME, OR GET ALONG WITH OTHER PEOPLE: SOMEWHAT DIFFICULT
GAD7 TOTAL SCORE: 7
6. BECOMING EASILY ANNOYED OR IRRITABLE: SEVERAL DAYS
5. BEING SO RESTLESS THAT IT IS HARD TO SIT STILL: SEVERAL DAYS
7. FEELING AFRAID AS IF SOMETHING AWFUL MIGHT HAPPEN: SEVERAL DAYS
2. NOT BEING ABLE TO STOP OR CONTROL WORRYING: SEVERAL DAYS

## 2025-07-09 ASSESSMENT — ENCOUNTER SYMPTOMS
SLEEP DISTURBANCE: 1
FREQUENCY: 1
UNEXPECTED WEIGHT CHANGE: 1
OCCASIONAL FEELINGS OF UNSTEADINESS: 1
DIARRHEA: 0
NAUSEA: 0
ACTIVITY CHANGE: 1
CONSTIPATION: 1
FATIGUE: 1
NERVOUS/ANXIOUS: 1
DEPRESSION: 1
WEAKNESS: 1
LOSS OF SENSATION IN FEET: 1
NUMBNESS: 1

## 2025-07-09 ASSESSMENT — PATIENT HEALTH QUESTIONNAIRE - PHQ9
2. FEELING DOWN, DEPRESSED OR HOPELESS: SEVERAL DAYS
SUM OF ALL RESPONSES TO PHQ9 QUESTIONS 1 AND 2: 2
1. LITTLE INTEREST OR PLEASURE IN DOING THINGS: SEVERAL DAYS

## 2025-07-09 ASSESSMENT — GERIATRIC MINI NUTRITIONAL ASSESSMENT (MNA)
A HAS FOOD INTAKE DECLINED OVER THE PAST 3 MONTHS DUE TO LOSS OF APPETITE, DIGESTIVE PROBLEMS, CHEWING OR SWALLOWING DIFFICULTIES?: NO DECREASE IN FOOD INTAKE
B WEIGHT LOSS DURING THE LAST 3 MONTHS: NO WEIGHT LOSS

## 2025-07-09 NOTE — PROGRESS NOTES
Subjective   Patient ID: Sylvia Martinez is a 89 y.o. female who presents for  follow up after hospitalization/ SNF admission.  Seen in a wheelchair.  Weak.  Having Back pain.  Complaining of Bloating.  Waking with walker.  Nocturia.  Patient is here for Medicare Wellness Annual exam.    HPI     This is an 89 years old Citizen of [place] speaking lady with medical history significant for abdominal pain, gastroesophageal reflux disease, chronic bloating, discomfort, constipation, history of fundoplasty, varicosities, severe low back pain, lumbosacral stenosis, arthralgia, hypothyroidism, diabetes, hyper lipidemia, vitamin D. deficiency, depression, anxiety, headaches.,s/p EGD, colonoscopy 8/13/2018 with small cecal polyp, diverticulosis, gastropathy, s/p Hospitalization 5/14/2021 for arrhythmia, s/p admission to [place] 4/7/2022 to 4/8/2022 with atrial fibrillation, s/p L cataract surgery 6/7/2022, COVID -19 8/26/2022, s/p ED visit 10/14/2023 for colitis, had Rectal and sigmoid wall thickening , concerning for proctitis, colitis, s/p hospitalization for Acute ischemic right MCA stroke, s/p Thrombectomy, acute respiratory failure with hypercapnia, Pneumonia of FERNANDA, asthma, Diabetes Type 2, CLL 4/9/2025 to 4/15/2925, s/p Rehabilitation admission 4/15/2025 to 4/22/2025, s/p ED visit 4/22/2025 with  hemorrhagic cystitis, Pneumonia FERNANDA, Diabetes Type 2, s/p Admission  for rectal bleeding, Enterococcal UTI, treated with daptomycin  4/27/2025 to 5/5/2025, s/p SNF admission 5/5/2025 to 5/20/2025 .     Patient is  presented  for follow up after hospitalization/ SNF admission.    She is in the room with her   and son present.  Seen in a wheelchair.  Weak.  Has multiple complaints.  Depressed, having insomnia.     Has low energy, fatigue.  Followed by oncology Dr. Meyer for CLL, follicular lymphoma .  Depressed, followed by Dr Washington.      Blood pressure is stable, better controlled.  Has occasional constipations.  Weak  at the time.  Stable.  Has bloating, abdominal discomfort.  Has malaise, fatigue.  Taking Miralax, could not tolerate Linzess.      Followed by  GI  Dr Pickard.     Denies to have any chest pain, shortness of breath, no fever or chills.  Has no headaches.     Followed by Dr. Galo.     Stated, that followed by the urology.  Blood glucose is stable, controlled by diet.    Review of Systems   Constitutional:  Positive for activity change, fatigue and unexpected weight change.   Gastrointestinal:  Positive for constipation. Negative for diarrhea and nausea.   Genitourinary:  Positive for frequency.   Neurological:  Positive for weakness and numbness.   Psychiatric/Behavioral:  Positive for sleep disturbance. The patient is nervous/anxious.        Objective   /62   Pulse 62   Temp 37.4 °C (99.3 °F) (Temporal)   Resp 16   Wt 59 kg (130 lb)   BMI 29.15 kg/m²     Physical Exam  Constitutional:       Appearance: Normal appearance.   HENT:      Head: Normocephalic.   Cardiovascular:      Rate and Rhythm: Normal rate. Rhythm irregular.      Heart sounds: Normal heart sounds.   Pulmonary:      Breath sounds: Normal breath sounds.   Musculoskeletal:         General: No tenderness.   Skin:     General: Skin is warm.   Neurological:      Mental Status: She is alert.         Assessment/Plan   Problem List Items Addressed This Visit           ICD-10-CM    Anemia - Primary D64.9    Relevant Orders    CBC w/5 Part Differential, [facility]    Iron and TIBC    Ferritin    Type 2 diabetes mellitus with other specified complication, without long-term current use of insulin E11.69    Relevant Orders    Hemoglobin A1C    Paroxysmal atrial fibrillation (Multi) I48.0    Relevant Medications    aspirin 81 mg EC tablet    Hypothyroidism E03.9    Relevant Orders    Thyroid Stimulating Hormone    Hyperlipidemia, mixed E78.2    Relevant Medications    folic acid (Folvite) 1 mg tablet    melatonin 3 mg tablet    Other Relevant Orders     Comprehensive Metabolic Panel    Lipid Panel    Vitamin D deficiency E55.9    Relevant Orders    Vitamin D 25-Hydroxy,Total (for eval of Vitamin D levels)    Vitamin B12 deficiency E53.8    Relevant Orders    Vitamin B12    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    Relevant Medications    folic acid (Folvite) 1 mg tablet    melatonin 3 mg tablet    pantoprazole (ProtoNix) 40 mg EC tablet    calcium carbonate (Tums) 500 mg (200 mg elemental) chewable tablet    aspirin 81 mg EC tablet    mirtazapine (Remeron) 15 mg tablet    Chronic viral hepatitis B without delta agent and without coma (Multi) B18.1     Other Visit Diagnoses         Codes      Depression, unspecified depression type     F32.A    Relevant Medications    melatonin 3 mg tablet    mirtazapine (Remeron) 15 mg tablet      Insomnia, unspecified type     G47.00    Relevant Medications    mirtazapine (Remeron) 15 mg tablet      Cerebrovascular accident (CVA) due to embolism of right middle cerebral artery (Multi)     I63.411    Relevant Medications    folic acid (Folvite) 1 mg tablet    melatonin 3 mg tablet    pantoprazole (ProtoNix) 40 mg EC tablet    calcium carbonate (Tums) 500 mg (200 mg elemental) chewable tablet    aspirin 81 mg EC tablet    mirtazapine (Remeron) 15 mg tablet      Routine general medical examination at health care facility     Z00.00    Relevant Orders    1 Year Follow Up In Primary Care - Wellness Exam            S/p Hospitalization 4/9/2025 to 4/15/2925 for     Acute ischemic right MCA stroke  s/p Thrombectomy, acute respiratory failure with hypercapnia, Pneumonia of FERNANDA, asthma, Diabetes Type 2, CLL .  Patient was transferred to SNF 4/15/2025 to 4/22/2025.  Was participating in physical therapy, making a good progress.  Patient developed hematuria and was sent to the hospital 4/22/2025.    s/p  admission to the Hospital 4/22/2025  to 4/25/2025 for  hemorrhagic cystitis, Pneumonia  FERNANDA, Diabetes Type 2.  Patient was treated for UTI and pneumonia.  Eliquis was resumed for her arterial fibrillation.    S/p readmission for rehab 4/25/2025.  Patient developed hematochezia and was sent to the hospital for evaluation, Eliquis was discontinued.  Patient declined colonoscopy.  Treated with daptomycin for enterococcal UTI.    s/p Admission  to the Hospital 4/27/2025 to 5/5/2025  for rectal bleeding,   Had Enterococcal UTI, treated with daptomycin .      s/p SNF admission 5/5/2025 to 5/20/2025.  She was discharged with SN , PT and home health.      Anxiety, depression.  Follow-up with psychiatry Dr Washington.   Taking mirtazapine.     CLL/ Grade 1 follicular lymphoma.  Nasopharyngeal lesion/ Neck mass, Mostly, due to Follicular Lymphoma.  S/p Bx by Dr Booker 7/22/2019.  Followed  by  Dr Meyer.  Patient was advised to start on therapy, declined for now.     -Bacterial overgrowth syndrome.  Abdominal discomfort.  Bloating.   IBS.  Continue to use  Dicyclomine.  Strict diet.  Follow-up with GI.      -Atrial fibrillation episodes.  Palpitations.  Followed by Dr Galo,  EP cardiology 3/22/43374.  Return in 6 month.  Take Flecainide  as directed, off Eliquis, taking ASA.     - Hypertension.  Well-controlled.  Current therapy.  Exercise, avoid salt.     S/p ED visit 10/14/2023 for proctitis, colitis .  Evaluated by  ED of [facility], CT scan done.  Started on Flagyl and Keflex, was not able to tolerate Flagyl due to nausea.  Has incoming GI appointment with GI DR Pickard.     as directed.      S/p Right eye surgery cataract 1/17/2023.   Good results.     - H of  COVID -19 illness 8/26/2022.  Has still cough, some shortness of breath.  Extremely weak.   Could not tolerate medications.  Chest X ray.     -Overactive Bladder.  Stable.  Follow up with urology.     -Diabetes type II.  Accu-Cheks, take medications as directed.  Diet, exercise.  Off medications.     - Health maintenance.  Medicare Wellness  Annual Exam is  today.  Colonoscopy screening is not indicated.  Declined to see GYN.     Hearing loss.  Has bilateral hearing aids.     - Overweight with BMI  29.15.  Lost > 4 Lbs  Diet, exercise.  Keep ideal BMI is less than 25.    Patient is seen in a wheelchair.   In need for SN , PT to help with disease teaching, symptoms control, PT to improve endurance.    Patient was identified as a fall risk. Risk prevention instructions provided.

## 2025-07-10 LAB
25(OH)D3 SERPL-MCNC: 50 NG/ML (ref 30–100)
ALBUMIN SERPL BCP-MCNC: 2.7 G/DL (ref 3.4–5)
ALP SERPL-CCNC: 98 U/L (ref 45–117)
ALT SERPL W P-5'-P-CCNC: 19 U/L (ref 16–63)
ANION GAP SERPL CALC-SCNC: 12 MMOL/L (ref 10–20)
AST SERPL W P-5'-P-CCNC: 17 U/L (ref 15–37)
BASOPHILS # BLD AUTO: 0.02 X10*3/UL (ref 0.1–1.6)
BASOPHILS NFR BLD AUTO: 0.21 % (ref 0–0.3)
BILIRUB SERPL-MCNC: 0.2 MG/DL (ref 0.2–1)
BUN SERPL-MCNC: 19 MG/DL (ref 7–18)
CALCIUM SERPL-MCNC: 8.4 MG/DL (ref 8.5–10.1)
CHLORIDE SERPL-SCNC: 109 MMOL/L (ref 98–107)
CHOLEST SERPL-MCNC: 110 MG/DL (ref 0–199)
CHOLESTEROL/HDL RATIO: 3 (ref 4.2–7)
CO2 SERPL-SCNC: 24 MMOL/L (ref 21–32)
CREAT SERPL-MCNC: 0.68 MG/DL (ref 0.6–1.1)
EGFRCR SERPLBLD CKD-EPI 2021: 84 ML/MIN/1.73M*2
EOSINOPHIL # BLD AUTO: 0.32 X10*3/UL (ref 0.04–0.5)
EOSINOPHIL NFR BLD AUTO: 2.95 % (ref 0.7–7)
ERYTHROCYTE [DISTWIDTH] IN BLOOD BY AUTOMATED COUNT: 17.7 % (ref 11.5–14.5)
FERRITIN SERPL-MCNC: 27 NG/ML (ref 16–288)
GLUCOSE SERPL-MCNC: 209 MG/DL (ref 74–100)
HBA1C MFR BLD: 6.6 %
HCT VFR BLD AUTO: 34.1 % (ref 36.6–46.6)
HDLC SERPL-MCNC: 37 MG/DL (ref 40–59)
HGB BLD-MCNC: 11.4 G/DL (ref 12–15.4)
IRON SATN MFR SERPL: 12 % (CALC) (ref 16–45)
IRON SERPL-MCNC: 32 MCG/DL (ref 45–160)
IS PATIENT FASTING: NO
LDLC SERPL DIRECT ASSAY-MCNC: 56 MG/DL (ref 0–100)
LYMPHOCYTES # BLD AUTO: 6.07 X10*3/UL (ref 0–6)
LYMPHOCYTES NFR BLD AUTO: 55.92 % (ref 20.5–51.1)
MCH RBC QN AUTO: 29.5 PG (ref 26–32)
MCHC RBC AUTO-ENTMCNC: 33.4 G/DL (ref 31–38)
MCV RBC AUTO: 88.3 FL (ref 80–96)
MONOCYTES # BLD AUTO: 0.52 X10*3/UL (ref 1.6–24.9)
MONOCYTES NFR BLD AUTO: 4.79 % (ref 1.7–9.3)
NEUTROPHILS # BLD AUTO: 3.92 X10*3/UL (ref 1.4–6.5)
NEUTROPHILS NFR BLD AUTO: 36.13 % (ref 42.2–75.2)
PLATELET # BLD AUTO: 317.3 X10*3/UL (ref 150–450)
PMV BLD AUTO: 8.13 FL (ref 7.8–11)
POTASSIUM SERPL-SCNC: 4.1 MMOL/L (ref 3.5–5.1)
PROT SERPL-MCNC: 5.8 G/DL (ref 6.4–8.2)
RBC # BLD AUTO: 3.86 X10*6/UL (ref 3.9–5.3)
SODIUM SERPL-SCNC: 141 MMOL/L (ref 136–145)
TIBC SERPL-MCNC: 276 MCG/DL (CALC) (ref 250–450)
TRIGL SERPL-MCNC: 106 MG/DL
TSH SERPL-ACNC: 3.53 MIU/L (ref 0.44–3.98)
VIT B12 SERPL-MCNC: 735 PG/ML (ref 193–986)
WBC # BLD AUTO: 10.85 X10*3/UL (ref 4.5–10.5)

## 2025-07-10 NOTE — PATIENT INSTRUCTIONS

## 2025-07-11 DIAGNOSIS — E11.69 TYPE 2 DIABETES MELLITUS WITH OTHER SPECIFIED COMPLICATION, WITHOUT LONG-TERM CURRENT USE OF INSULIN: Primary | ICD-10-CM

## 2025-07-11 DIAGNOSIS — R10.84 GENERALIZED ABDOMINAL PAIN: ICD-10-CM

## 2025-07-11 DIAGNOSIS — K21.9 GASTROESOPHAGEAL REFLUX DISEASE, UNSPECIFIED WHETHER ESOPHAGITIS PRESENT: ICD-10-CM

## 2025-07-11 DIAGNOSIS — K59.00 CONSTIPATION, UNSPECIFIED CONSTIPATION TYPE: Primary | ICD-10-CM

## 2025-07-11 DIAGNOSIS — E55.9 VITAMIN D DEFICIENCY: ICD-10-CM

## 2025-07-11 RX ORDER — ACETAMINOPHEN 500 MG
50 TABLET ORAL DAILY
Qty: 90 CAPSULE | Refills: 3 | Status: SHIPPED | OUTPATIENT
Start: 2025-07-11

## 2025-07-11 RX ORDER — DOCUSATE SODIUM 100 MG/1
100 CAPSULE, LIQUID FILLED ORAL 2 TIMES DAILY
Qty: 60 CAPSULE | Refills: 3 | Status: SHIPPED | OUTPATIENT
Start: 2025-07-11

## 2025-07-11 RX ORDER — POLYETHYLENE GLYCOL 3350 17 G/17G
17 POWDER, FOR SOLUTION ORAL
Qty: 90 PACKET | Refills: 3 | Status: SHIPPED | OUTPATIENT
Start: 2025-07-11

## 2025-07-11 RX ORDER — PANTOPRAZOLE SODIUM 40 MG/1
40 TABLET, DELAYED RELEASE ORAL EVERY 24 HOURS
Qty: 90 TABLET | Refills: 3 | Status: SHIPPED | OUTPATIENT
Start: 2025-07-11

## 2025-07-11 RX ORDER — DICYCLOMINE HYDROCHLORIDE 10 MG/1
20 CAPSULE ORAL DAILY
Qty: 180 CAPSULE | Refills: 3 | Status: SHIPPED | OUTPATIENT
Start: 2025-07-11

## 2025-07-16 ENCOUNTER — APPOINTMENT (OUTPATIENT)
Dept: PRIMARY CARE | Facility: CLINIC | Age: 89
End: 2025-07-16
Payer: MEDICARE

## 2025-08-08 DIAGNOSIS — E03.9 HYPOTHYROIDISM, UNSPECIFIED: ICD-10-CM

## 2025-08-08 DIAGNOSIS — G47.00 INSOMNIA, UNSPECIFIED TYPE: ICD-10-CM

## 2025-08-08 DIAGNOSIS — E78.2 HYPERLIPIDEMIA, MIXED: Primary | ICD-10-CM

## 2025-08-08 RX ORDER — ATORVASTATIN CALCIUM 40 MG/1
40 TABLET, FILM COATED ORAL NIGHTLY
Qty: 30 TABLET | Refills: 6 | Status: SHIPPED | OUTPATIENT
Start: 2025-08-08

## 2025-08-08 RX ORDER — LEVOTHYROXINE SODIUM 88 UG/1
TABLET ORAL
Qty: 30 TABLET | Refills: 6 | Status: SHIPPED | OUTPATIENT
Start: 2025-08-08

## 2025-08-08 RX ORDER — MIRTAZAPINE 7.5 MG/1
7.5 TABLET, FILM COATED ORAL NIGHTLY
Qty: 30 TABLET | Refills: 6 | Status: SHIPPED | OUTPATIENT
Start: 2025-08-08

## 2025-08-12 ENCOUNTER — TELEPHONE (OUTPATIENT)
Dept: PRIMARY CARE | Facility: CLINIC | Age: 89
End: 2025-08-12

## 2025-08-12 ENCOUNTER — HOSPITAL ENCOUNTER (OUTPATIENT)
Dept: RADIOLOGY | Facility: CLINIC | Age: 89
Discharge: HOME | End: 2025-08-12
Payer: MEDICARE

## 2025-08-12 ENCOUNTER — APPOINTMENT (OUTPATIENT)
Dept: PRIMARY CARE | Facility: CLINIC | Age: 89
End: 2025-08-12
Payer: MEDICARE

## 2025-08-12 VITALS
TEMPERATURE: 97.9 F | DIASTOLIC BLOOD PRESSURE: 72 MMHG | SYSTOLIC BLOOD PRESSURE: 118 MMHG | RESPIRATION RATE: 16 BRPM | HEART RATE: 62 BPM | WEIGHT: 110 LBS | BODY MASS INDEX: 24.66 KG/M2

## 2025-08-12 DIAGNOSIS — K59.00 CONSTIPATION, UNSPECIFIED CONSTIPATION TYPE: Primary | ICD-10-CM

## 2025-08-12 DIAGNOSIS — E53.8 VITAMIN B12 DEFICIENCY: ICD-10-CM

## 2025-08-12 DIAGNOSIS — R53.83 MALAISE AND FATIGUE: ICD-10-CM

## 2025-08-12 DIAGNOSIS — R05.9 COUGH, UNSPECIFIED TYPE: ICD-10-CM

## 2025-08-12 DIAGNOSIS — E11.69 TYPE 2 DIABETES MELLITUS WITH OTHER SPECIFIED COMPLICATION, WITHOUT LONG-TERM CURRENT USE OF INSULIN: ICD-10-CM

## 2025-08-12 DIAGNOSIS — R53.81 MALAISE AND FATIGUE: ICD-10-CM

## 2025-08-12 DIAGNOSIS — K59.00 CONSTIPATION, UNSPECIFIED CONSTIPATION TYPE: ICD-10-CM

## 2025-08-12 DIAGNOSIS — J45.909 ASTHMA, UNSPECIFIED ASTHMA SEVERITY, UNSPECIFIED WHETHER COMPLICATED, UNSPECIFIED WHETHER PERSISTENT (HHS-HCC): ICD-10-CM

## 2025-08-12 DIAGNOSIS — E55.9 VITAMIN D DEFICIENCY: ICD-10-CM

## 2025-08-12 DIAGNOSIS — E78.2 HYPERLIPIDEMIA, MIXED: ICD-10-CM

## 2025-08-12 DIAGNOSIS — D50.9 IRON DEFICIENCY ANEMIA, UNSPECIFIED IRON DEFICIENCY ANEMIA TYPE: ICD-10-CM

## 2025-08-12 DIAGNOSIS — C91.10 CLL (CHRONIC LYMPHOCYTIC LEUKEMIA) (MULTI): ICD-10-CM

## 2025-08-12 DIAGNOSIS — E03.9 HYPOTHYROIDISM, UNSPECIFIED TYPE: ICD-10-CM

## 2025-08-12 LAB
25(OH)D3 SERPL-MCNC: 47 NG/ML (ref 30–100)
ALBUMIN SERPL BCP-MCNC: 3 G/DL (ref 3.4–5)
ALP SERPL-CCNC: 166 U/L (ref 45–117)
ALT SERPL W P-5'-P-CCNC: 20 U/L (ref 16–63)
ANION GAP SERPL CALC-SCNC: 18 MMOL/L (ref 10–20)
AST SERPL W P-5'-P-CCNC: 16 U/L (ref 15–37)
BASOPHILS # BLD AUTO: 0.02 X10*3/UL (ref 0.1–1.6)
BASOPHILS NFR BLD AUTO: 0.14 % (ref 0–0.3)
BILIRUB SERPL-MCNC: 0.3 MG/DL (ref 0.2–1)
BUN SERPL-MCNC: 20 MG/DL (ref 7–18)
CALCIUM SERPL-MCNC: 9.2 MG/DL (ref 8.5–10.1)
CHLORIDE SERPL-SCNC: 105 MMOL/L (ref 98–107)
CO2 SERPL-SCNC: 24 MMOL/L (ref 21–32)
CREAT SERPL-MCNC: 0.59 MG/DL (ref 0.6–1.1)
EGFRCR SERPLBLD CKD-EPI 2021: 87 ML/MIN/1.73M*2
EOSINOPHIL # BLD AUTO: 0.11 X10*3/UL (ref 0.04–0.5)
EOSINOPHIL NFR BLD AUTO: 0.73 % (ref 0.7–7)
ERYTHROCYTE [DISTWIDTH] IN BLOOD BY AUTOMATED COUNT: 18.5 % (ref 11.5–14.5)
GLUCOSE SERPL-MCNC: 114 MG/DL (ref 74–100)
HBA1C MFR BLD: 6.6 %
HCT VFR BLD AUTO: 36.1 % (ref 36.6–46.6)
HGB BLD-MCNC: 11.33 G/DL (ref 12–15.4)
LYMPHOCYTES # BLD AUTO: 9.55 X10*3/UL (ref 0–6)
LYMPHOCYTES NFR BLD AUTO: 65.29 % (ref 20.5–51.1)
MCH RBC QN AUTO: 27.3 PG (ref 26–32)
MCHC RBC AUTO-ENTMCNC: 31.4 G/DL (ref 31–38)
MCV RBC AUTO: 87 FL (ref 80–96)
MONOCYTES # BLD AUTO: 0.74 X10*3/UL (ref 1.6–24.9)
MONOCYTES NFR BLD AUTO: 5.04 % (ref 1.7–9.3)
NEUTROPHILS # BLD AUTO: 4.21 X10*3/UL (ref 1.4–6.5)
NEUTROPHILS NFR BLD AUTO: 28.8 % (ref 42.2–75.2)
PLATELET # BLD AUTO: 377.7 X10*3/UL (ref 150–450)
PMV BLD AUTO: 7.81 FL (ref 7.8–11)
POTASSIUM SERPL-SCNC: 4.5 MMOL/L (ref 3.5–5.1)
PROT SERPL-MCNC: 6.3 G/DL (ref 6.4–8.2)
RBC # BLD AUTO: 4.15 X10*6/UL (ref 3.9–5.3)
SODIUM SERPL-SCNC: 142 MMOL/L (ref 136–145)
TSH SERPL-ACNC: 8.05 MIU/L (ref 0.44–3.98)
VIT B12 SERPL-MCNC: 889 PG/ML (ref 193–986)
WBC # BLD AUTO: 14.63 X10*3/UL (ref 4.5–10.5)

## 2025-08-12 PROCEDURE — 82306 VITAMIN D 25 HYDROXY: CPT | Performed by: INTERNAL MEDICINE

## 2025-08-12 PROCEDURE — G2211 COMPLEX E/M VISIT ADD ON: HCPCS | Performed by: INTERNAL MEDICINE

## 2025-08-12 PROCEDURE — 1159F MED LIST DOCD IN RCRD: CPT | Performed by: INTERNAL MEDICINE

## 2025-08-12 PROCEDURE — 3074F SYST BP LT 130 MM HG: CPT | Performed by: INTERNAL MEDICINE

## 2025-08-12 PROCEDURE — 71046 X-RAY EXAM CHEST 2 VIEWS: CPT | Performed by: INTERNAL MEDICINE

## 2025-08-12 PROCEDURE — 3078F DIAST BP <80 MM HG: CPT | Performed by: INTERNAL MEDICINE

## 2025-08-12 PROCEDURE — 99214 OFFICE O/P EST MOD 30 MIN: CPT | Performed by: INTERNAL MEDICINE

## 2025-08-12 PROCEDURE — 84443 ASSAY THYROID STIM HORMONE: CPT | Performed by: INTERNAL MEDICINE

## 2025-08-12 PROCEDURE — 82607 VITAMIN B-12: CPT | Performed by: INTERNAL MEDICINE

## 2025-08-12 PROCEDURE — 85025 COMPLETE CBC W/AUTO DIFF WBC: CPT | Performed by: INTERNAL MEDICINE

## 2025-08-12 PROCEDURE — 1125F AMNT PAIN NOTED PAIN PRSNT: CPT | Performed by: INTERNAL MEDICINE

## 2025-08-12 PROCEDURE — 83036 HEMOGLOBIN GLYCOSYLATED A1C: CPT | Performed by: INTERNAL MEDICINE

## 2025-08-12 PROCEDURE — 1036F TOBACCO NON-USER: CPT | Performed by: INTERNAL MEDICINE

## 2025-08-12 PROCEDURE — 71046 X-RAY EXAM CHEST 2 VIEWS: CPT

## 2025-08-12 PROCEDURE — 80053 COMPREHEN METABOLIC PANEL: CPT | Performed by: INTERNAL MEDICINE

## 2025-08-12 PROCEDURE — 1160F RVW MEDS BY RX/DR IN RCRD: CPT | Performed by: INTERNAL MEDICINE

## 2025-08-12 RX ORDER — PREDNISONE 20 MG/1
TABLET ORAL
Qty: 30 TABLET | Refills: 0 | Status: SHIPPED | OUTPATIENT
Start: 2025-08-12

## 2025-08-12 RX ORDER — LORATADINE 10 MG/1
1 TABLET ORAL
COMMUNITY
Start: 2025-08-08

## 2025-08-12 RX ORDER — SULFAMETHOXAZOLE AND TRIMETHOPRIM 800; 160 MG/1; MG/1
1 TABLET ORAL 2 TIMES DAILY
Qty: 10 TABLET | Refills: 0 | Status: SHIPPED | OUTPATIENT
Start: 2025-08-12 | End: 2025-08-17

## 2025-08-12 RX ORDER — LUBIPROSTONE 0.02 MG/1
24 CAPSULE ORAL
COMMUNITY
Start: 2025-07-31

## 2025-08-12 RX ORDER — LUBIPROSTONE 0.02 MG/1
24 CAPSULE ORAL
Qty: 60 CAPSULE | Refills: 0 | Status: SHIPPED | OUTPATIENT
Start: 2025-08-12 | End: 2025-09-11

## 2025-08-12 RX ORDER — LUBIPROSTONE 0.02 MG/1
24 CAPSULE ORAL
Qty: 60 CAPSULE | Refills: 3 | Status: SHIPPED | OUTPATIENT
Start: 2025-08-12 | End: 2025-08-12

## 2025-08-12 RX ORDER — LEVOTHYROXINE SODIUM 100 UG/1
100 TABLET ORAL DAILY
Qty: 30 TABLET | Refills: 3 | Status: SHIPPED | OUTPATIENT
Start: 2025-08-12 | End: 2026-08-12

## 2025-08-12 ASSESSMENT — PAIN SCALES - GENERAL: PAINLEVEL_OUTOF10: 6

## 2025-08-12 ASSESSMENT — ENCOUNTER SYMPTOMS
FATIGUE: 1
ACTIVITY CHANGE: 1
WHEEZING: 1
COUGH: 1
DIARRHEA: 0
CONSTIPATION: 1
SHORTNESS OF BREATH: 1
NAUSEA: 0

## 2025-08-13 ENCOUNTER — TELEPHONE (OUTPATIENT)
Dept: PRIMARY CARE | Facility: CLINIC | Age: 89
End: 2025-08-13
Payer: MEDICARE

## 2025-08-13 DIAGNOSIS — K59.00 CONSTIPATION, UNSPECIFIED CONSTIPATION TYPE: Primary | ICD-10-CM

## 2025-08-18 DIAGNOSIS — E03.9 HYPOTHYROIDISM, UNSPECIFIED TYPE: ICD-10-CM

## 2025-08-18 RX ORDER — LEVOTHYROXINE SODIUM 100 UG/1
100 TABLET ORAL DAILY
Qty: 30 TABLET | Refills: 3 | Status: SHIPPED | OUTPATIENT
Start: 2025-08-18 | End: 2026-08-18

## 2025-08-20 ENCOUNTER — TELEPHONE (OUTPATIENT)
Dept: PRIMARY CARE | Facility: CLINIC | Age: 89
End: 2025-08-20
Payer: MEDICARE

## 2025-09-02 DIAGNOSIS — T78.40XS ALLERGY, UNSPECIFIED, SEQUELA: ICD-10-CM

## 2025-09-02 RX ORDER — LORATADINE 10 MG/1
10 TABLET ORAL DAILY
Qty: 30 TABLET | Refills: 3 | Status: SHIPPED | OUTPATIENT
Start: 2025-09-02

## 2026-07-08 ENCOUNTER — APPOINTMENT (OUTPATIENT)
Dept: PRIMARY CARE | Facility: CLINIC | Age: OVER 89
End: 2026-07-08
Payer: MEDICARE